# Patient Record
Sex: MALE | Race: WHITE | NOT HISPANIC OR LATINO | Employment: OTHER | URBAN - METROPOLITAN AREA
[De-identification: names, ages, dates, MRNs, and addresses within clinical notes are randomized per-mention and may not be internally consistent; named-entity substitution may affect disease eponyms.]

---

## 2017-01-13 ENCOUNTER — TRANSFERRED RECORDS (OUTPATIENT)
Dept: HEALTH INFORMATION MANAGEMENT | Facility: CLINIC | Age: 57
End: 2017-01-13

## 2017-02-17 ENCOUNTER — TRANSFERRED RECORDS (OUTPATIENT)
Dept: HEALTH INFORMATION MANAGEMENT | Facility: CLINIC | Age: 57
End: 2017-02-17

## 2017-03-24 ENCOUNTER — DOCUMENTATION ONLY (OUTPATIENT)
Dept: SURGERY | Facility: CLINIC | Age: 57
End: 2017-03-24

## 2017-03-24 NOTE — PROGRESS NOTES
Late entry - Pt discussed with Dr Ruff last week - Per Dr Ruff pt to be seen in clinic by him to discuss abdominal pain following multiple abdominal surgeries to include RYGB.  Per Dr Ruff, this pt is not appropriate to see Irene Gold PA-C given pt's chief complaint of abdominal complaints following RYGB and several other abdominal surgeries.    Records in clinic - Op notes to be faxed to both clinic and bariatric faxes.      Pennie Durbin RN

## 2017-03-27 ENCOUNTER — OFFICE VISIT (OUTPATIENT)
Dept: SURGERY | Facility: CLINIC | Age: 57
End: 2017-03-27

## 2017-03-27 VITALS
HEART RATE: 56 BPM | OXYGEN SATURATION: 100 % | BODY MASS INDEX: 30.69 KG/M2 | SYSTOLIC BLOOD PRESSURE: 137 MMHG | HEIGHT: 72 IN | WEIGHT: 226.6 LBS | DIASTOLIC BLOOD PRESSURE: 93 MMHG

## 2017-03-27 DIAGNOSIS — K31.83 ACHLORHYDRIA, GASTRIC: Primary | ICD-10-CM

## 2017-03-27 RX ORDER — SUMATRIPTAN 20 MG/1
1 SPRAY NASAL PRN
COMMUNITY
End: 2022-03-03

## 2017-03-27 RX ORDER — SUMATRIPTAN SUCCINATE 6 MG/.5ML
INJECTION, SOLUTION SUBCUTANEOUS ONCE
COMMUNITY

## 2017-03-27 RX ORDER — SUCRALFATE 1 G/1
1 TABLET ORAL 4 TIMES DAILY
COMMUNITY

## 2017-03-27 ASSESSMENT — ENCOUNTER SYMPTOMS
PARALYSIS: 0
SEIZURES: 0
RECTAL PAIN: 0
WEAKNESS: 0
DOUBLE VISION: 0
JAUNDICE: 0
SPEECH CHANGE: 0
NIGHT SWEATS: 0
NECK PAIN: 0
WHEEZING: 0
EYE IRRITATION: 0
SWOLLEN GLANDS: 0
POSTURAL DYSPNEA: 0
SINUS PAIN: 1
SMELL DISTURBANCE: 0
NAIL CHANGES: 0
COUGH: 0
TROUBLE SWALLOWING: 1
RESPIRATORY PAIN: 0
HYPOTENSION: 0
INSOMNIA: 1
FLANK PAIN: 0
BLOOD IN STOOL: 0
FEVER: 0
DYSPNEA ON EXERTION: 0
BLOATING: 1
SLEEP DISTURBANCES DUE TO BREATHING: 0
JOINT SWELLING: 0
NUMBNESS: 0
DYSURIA: 0
HEARTBURN: 1
SKIN CHANGES: 0
SNORES LOUDLY: 0
DISTURBANCES IN COORDINATION: 0
STIFFNESS: 0
HYPERTENSION: 0
NAUSEA: 1
ALTERED TEMPERATURE REGULATION: 1
VOMITING: 1
ORTHOPNEA: 0
SHORTNESS OF BREATH: 0
ARTHRALGIAS: 0
MEMORY LOSS: 0
WEIGHT GAIN: 0
LOSS OF CONSCIOUSNESS: 0
HEMOPTYSIS: 0
COUGH DISTURBING SLEEP: 0
MUSCLE CRAMPS: 0
DIARRHEA: 0
POOR WOUND HEALING: 0
CHILLS: 0
INCREASED ENERGY: 0
SYNCOPE: 0
POLYDIPSIA: 0
RECTAL BLEEDING: 0
DIZZINESS: 0
EYE PAIN: 0
CONSTIPATION: 0
BOWEL INCONTINENCE: 0
TASTE DISTURBANCE: 0
HEMATURIA: 0
EYE REDNESS: 0
TREMORS: 0
LIGHT-HEADEDNESS: 0
DIFFICULTY URINATING: 0
DECREASED APPETITE: 0
SINUS CONGESTION: 0
DEPRESSION: 1
NERVOUS/ANXIOUS: 0
HALLUCINATIONS: 0
LEG PAIN: 0
EXERCISE INTOLERANCE: 0
EYE WATERING: 0
SPUTUM PRODUCTION: 0
HOARSE VOICE: 0
BRUISES/BLEEDS EASILY: 0
CLAUDICATION: 0
DECREASED CONCENTRATION: 0
TACHYCARDIA: 0
NECK MASS: 0
WEIGHT LOSS: 0
TINGLING: 0
PALPITATIONS: 0
FATIGUE: 1
LEG SWELLING: 0
MYALGIAS: 0
PANIC: 0
MUSCLE WEAKNESS: 0
SORE THROAT: 1
ABDOMINAL PAIN: 1
BACK PAIN: 0
POLYPHAGIA: 0
HEADACHES: 1

## 2017-03-27 ASSESSMENT — PAIN SCALES - GENERAL: PAINLEVEL: MODERATE PAIN (5)

## 2017-03-27 NOTE — MR AVS SNAPSHOT
After Visit Summary   3/27/2017    Joseph Perdomo    MRN: [id number]           Patient Information     Date Of Birth          1960        Visit Information        Provider Department      3/27/2017 8:00 AM Brandon Ruff MD [facility] Surgery        Care Instructions    It was a pleasure meeting with you today.     Thank you for allowing us the privilege of caring for you. We hope we provided you with the excellent service you deserve.     Please let us know if there is anything else we can do for you so that we can be sure you are leaving completely satisfied with your care experience.      You saw Dr Fulton today.     Instructions per today's visit: Stop smoking, need to obtain endoscopy report and CT.   We will contact you after reviewing information with plan.      To schedule appointments with our team, please call [phone] option #1    Please call during clinic hours Monday through Friday 8:00a - 4:00p if you have questions or you can contact us via [employer] at anytime.      Nurses: [phone] Option # 3 for nurse advice line.  Fax: [phone]  Surgery Scheduler: [phone]    Please call the hospital at [phone] to speak with our on call MDs if you have urgent needs after hours, during weekends, or holidays.            Follow-ups after your visit        Who to contact     Please call your clinic at [phone] to:    Ask questions about your health    Make or cancel appointments    Discuss your medicines    Learn about your test results    Speak to your doctor   If you have compliments or concerns about an experience at your clinic, or if you wish to file a complaint, please contact [facility] Patient Relations at [phone] or email us at [email or web address]         Additional Information About Your Visit        [employer] Information     [employer] is an electronic gateway that provides easy, online access to your medical  records. With [employer], you can request a clinic appointment, read your test results, renew a prescription or communicate with your care team.     To sign up for [employer] visit the website at [email or web address]   You will be asked to enter the access code listed below, as well as some personal information. Please follow the directions to create your username and password.     Your access code is: [email or web address]  Expires: 2017  6:30 AM     Your access code will  in 90 days. If you need help or a new code, please contact your [facility] or call [phone] for assistance.        Care EveryWhere ID     This is your Care EveryWhere ID. This could be used by other organizations to access your [place] medical records  [id number]        Your Vitals Were     Pulse Height Pulse Oximetry BMI (Body Mass Index)          56 6' 100% 30.73 kg/m2         Blood Pressure from Last 3 Encounters:   17 (!) 137/93    Weight from Last 3 Encounters:   17 226 lb 9.6 oz              Today, you had the following     No orders found for display       Primary Care Provider Office Phone # Fax #    Tyler J Dunphy [phone]       [facility] [address] [place] [address]        Thank you!     Thank you for choosing [facility] SURGERY  for your care. Our goal is always to provide you with excellent care. Hearing back from our patients is one way we can continue to improve our services. Please take a few minutes to complete the written survey that you may receive in the mail after your visit with us. Thank you!             Your Updated Medication List - Protect others around you: Learn how to safely use, store and throw away your medicines at www.disposemymeds.org.          This list is accurate as of: 3/27/17  8:46 AM.  Always use your most recent med list.                   Brand Name Dispense Instructions for use    AMBIEN PO      Take 10 mg by  mouth At Bedtime       DITROPAN PO      Take 5 mg by mouth 3 times daily       LYRICA PO      Take 100 mg by mouth 3 times daily       PRILOSEC PO      Take 40 mg by mouth 2 times daily (before meals)       sucralfate 1 GM tablet    CARAFATE     Take 1 g by mouth 4 times daily       SUMAtriptan 20 MG/ACT nasal spray    IMITREX     Spray 1 spray in nostril as needed for migraine       SUMAtriptan Succinate Refill 6 MG/0.5ML Soct    IMITREX     Inject Subcutaneous once       TOPAMAX PO      Take 25 mg by mouth 2 times daily       ZOCOR PO      Take 20 mg by mouth At Bedtime       ZOFRAN ODT PO      Take 4 mg by mouth every 8 hours as needed for nausea

## 2017-03-27 NOTE — PROGRESS NOTES
New General Surgery Consultation Note        Joseph Perdomo  [id number]  1960 March 27, 2017     Requesting Provider: Tyler J Dunphy     I had the pleasure of seeing your patient, Joseph Perdomo.  He is a 56 year old male who is being seen in consultation for the following concern(s).     CHIEF COMPLAINT:  Chief Complaint Reviewed With Patient 3/27/2017   I am here today to be seen for: abdominal pain       HISTORY OF PRESENT ILLNESS:  56 year old male with complicated past surgical history who presents with left sided abdominal pain and frequent vomiting.  Foregut surgical history includes multiple fundoplication surgeries about 10 years ago for GERD.  These were effective for a few years.  He also had ventral hernia repair with mesh.  In 1318-1921 he began having pain over his left side and frequent vomiting similar to his current symptoms.  In early 2013 he had a perforation of his stomach and underwent emergency surgery.  Mesh was found to be infected at that time as well and was removed.  Over the next few months he had problems with gastroparesis and ended up undergoing a distal gastric resection with Lucinda en Y reconstruction.  He has done fairly well with this until the last 8 months or so when his symptoms of pain and vomiting have started again.  His current symptoms do not seem to be related to food intake.  Pain and vomiting seemed to be linked with pain increasing during vomiting.   He states that he has had a recent endoscopy which was negative.     SEVERITY:   Severity of Present Illness Reviewed With Patient 3/27/2017   How would you describe your symptoms? Moderate       TIMING:  Timing of Present Illness Reviewed With Patient 3/27/2017   The onset of my symptoms was: Gradual   My symptoms are: Intermittent (come and go)   My symptoms have been: Worsening     Review of Systems     Constitutional:  Positive for fatigue and recent stressors. Negative for fever, chills, weight loss, weight  gain, decreased appetite, night sweats, height loss, post-operative complications, incisional pain, hallucinations, increased energy, hyperactivity and confused.   HENT:  Positive for trouble swallowing, sore throat, tooth pain, gum tenderness and sinus pain. Negative for ear pain, hearing loss, tinnitus, nosebleeds, hoarse voice, mouth sores, ear discharge, taste disturbance, smell disturbance, hearing aid, bleeding gums, dry mouth, sinus congestion and neck mass.    Eyes:  Negative for double vision, pain, redness, eye pain, decreased vision, eye watering, eye bulging, eye dryness, flashing lights, spots, floaters, strabismus, tunnel vision, jaundice and eye irritation.   Respiratory:   Negative for cough, hemoptysis, sputum production, shortness of breath, wheezing, sleep disturbances due to breathing, snores loudly, respiratory pain, dyspnea on exertion, cough disturbing sleep and postural dyspnea.    Cardiovascular:  Negative for chest pain, dyspnea on exertion, palpitations, orthopnea, claudication, leg swelling, fingers/toes turn blue, hypertension, hypotension, syncope, history of heart murmur, chest pain on exertion, chest pain at rest, pacemaker, few scattered varicosities, leg pain, sleep disturbances due to breathing, tachycardia, light-headedness, exercise intolerance and edema.   Gastrointestinal:  Positive for heartburn, nausea, vomiting, abdominal pain, bloating and change in stool. Negative for diarrhea, constipation, blood in stool, melena, rectal pain, hemorrhoids, bowel incontinence, jaundice, rectal bleeding and coffee ground emesis.   Genitourinary:  Negative for bladder incontinence, dysuria, urgency, hematuria, flank pain, difficulty urinating, nocturia, voiding less frequently, scrotal pain, ulcerations, penile discharge, male genitourinary complaint and reduced libido.   Musculoskeletal:  Negative for myalgias, back pain, joint swelling, arthralgias, stiffness, muscle cramps, neck pain,  bone pain, muscle weakness and fracture.   Skin:  Negative for nail changes, itching, poor wound healing, rash, hair changes, skin changes, acne, warts, poor wound healing, scarring, flaky skin, Raynaud's phenomenon, sensitivity to sunlight and skin thickening.   Neurological:  Positive for headaches. Negative for dizziness, tingling, tremors, speech change, seizures, loss of consciousness, weakness, light-headedness, numbness, disturbances in coordination, memory loss, difficulty walking and paralysis.   Endo/Heme:  Negative for anemia, swollen glands and bruises/bleeds easily.   Psychiatric/Behavioral:  Positive for depression. Negative for hallucinations, memory loss, decreased concentration, mood swings and panic attacks.    Endocrine:  Positive for altered temperature regulation.Negative for polyphagia, polydipsia, unwanted hair growth and change in facial hair.      PAST MEDICAL HISTORY:  HTN  Kidney stones  Fatty liver     PAST SURGICAL HISTORY:  Fundoplication x2  Multiple abdominal hernia repairs with mesh  Ex lap for stomach perforation  RYGB for gastroparesis  Sigmoidectomy for diverticulitis  Multiple back and neck procedures     MEDICATIONS:  Current Outpatient Prescriptions   Medication     Omeprazole (PRILOSEC PO)     Ondansetron (ZOFRAN ODT PO)     Pregabalin (LYRICA PO)     Oxybutynin Chloride (DITROPAN PO)     Simvastatin (ZOCOR PO)     sucralfate (CARAFATE) 1 GM tablet     SUMAtriptan (IMITREX) 20 MG/ACT nasal spray     SUMAtriptan Succinate Refill (IMITREX) 6 MG/0.5ML SOCT     Topiramate (TOPAMAX PO)     Zolpidem Tartrate (AMBIEN PO)     No current facility-administered medications for this visit.         ALLERGIES:  Allergies not on file     SOCIAL HISTORY:  1 ppd smoker  1 drink/month alcohol  Denies other drugs    FAMILY HISTORY:  No family history on file.     PHYSICAL EXAM:  Vital Signs: BP (!) 137/93  Pulse 56  Ht 1.829 m (6')  Wt 102.8 kg (226 lb 9.6 oz)  SpO2 100%  BMI 30.73  kg/m2  HEENT: NCAT; MMM;   Lungs: Breathing unlabored  Abdomen: soft, non distended.  Multiple well healed scars.  Mild tenderness in LUQ    ASSESSMENT:   Joseph Perdomo is a 56 year old male with extensive past surgical history who presents with 8 months of LUQ pain and vomiting.  In all likelihood, this pain is secondary to ulcer disease for which he would be very high risk secondary to his smoking and altered anatomy.  This could be the case even though no obvious ulceration was seen on recent endoscopy.  We discussed the importance of smoking cessation and the role it plays in ischemia of intraabdominal organs.      PLAN:   - Smoking cessation  - Avoid NSAIDs  - Continue PPI  - Obtain records from OSH for recent endoscopy and CT scan  - After evaluation of imaging, we will determine if further studies are needed  No orders of the defined types were placed in this encounter.      Sincerely,     Brandon Ruff MD  Surgery  [phone] (hospital )  [phone] (clinic nurses)

## 2017-03-27 NOTE — LETTER
3/27/2017       RE: Joseph Perdomo  [address] [place] [address]     Dear Colleague,    Thank you for referring your patient, Joseph Perdomo, to the [facility] GENERAL SURGERY at [facility]. Please see a copy of my visit note below.        New General Surgery Consultation Note        Joseph Perdomo  [id number]  1960 March 27, 2017     Requesting Provider: Tyler J Dunphy     Dear Dr Dunphy, Tyler J,     I had the pleasure of seeing your patient, Joseph Perdomo.  He is a 56 year old male who is being seen in consultation for the following concern(s).     CHIEF COMPLAINT:  Chief Complaint Reviewed With Patient 3/27/2017   I am here today to be seen for: abdominal pain       HISTORY OF PRESENT ILLNESS:  56 year old male with complicated past surgical history who presents with left sided abdominal pain and frequent vomiting.  Foregut surgical history includes multiple fundoplication surgeries about 10 years ago for GERD.  These were effective for a few years.  He also had ventral hernia repair with mesh.  In 2481-4060 he began having pain over his left side and frequent vomiting similar to his current symptoms.  In early 2013 he had a perforation of his stomach and underwent emergency surgery.  Mesh was found to be infected at that time as well and was removed.  Over the next few months he had problems with gastroparesis and ended up undergoing a distal gastric resection with Lucinda en Y reconstruction.  He has done fairly well with this until the last 8 months or so when his symptoms of pain and vomiting have started again.  His current symptoms do not seem to be related to food intake.  Pain and vomiting seemed to be linked with pain increasing during vomiting.   He states that he has had a recent endoscopy which was negative.     SEVERITY:   Severity of Present Illness Reviewed With Patient 3/27/2017   How would you describe your symptoms? Moderate        TIMING:  Timing of Present Illness Reviewed With Patient 3/27/2017   The onset of my symptoms was: Gradual   My symptoms are: Intermittent (come and go)   My symptoms have been: Worsening     Review of Systems     Constitutional:  Positive for fatigue and recent stressors. Negative for fever, chills, weight loss, weight gain, decreased appetite, night sweats, height loss, post-operative complications, incisional pain, hallucinations, increased energy, hyperactivity and confused.   HENT:  Positive for trouble swallowing, sore throat, tooth pain, gum tenderness and sinus pain. Negative for ear pain, hearing loss, tinnitus, nosebleeds, hoarse voice, mouth sores, ear discharge, taste disturbance, smell disturbance, hearing aid, bleeding gums, dry mouth, sinus congestion and neck mass.    Eyes:  Negative for double vision, pain, redness, eye pain, decreased vision, eye watering, eye bulging, eye dryness, flashing lights, spots, floaters, strabismus, tunnel vision, jaundice and eye irritation.   Respiratory:   Negative for cough, hemoptysis, sputum production, shortness of breath, wheezing, sleep disturbances due to breathing, snores loudly, respiratory pain, dyspnea on exertion, cough disturbing sleep and postural dyspnea.    Cardiovascular:  Negative for chest pain, dyspnea on exertion, palpitations, orthopnea, claudication, leg swelling, fingers/toes turn blue, hypertension, hypotension, syncope, history of heart murmur, chest pain on exertion, chest pain at rest, pacemaker, few scattered varicosities, leg pain, sleep disturbances due to breathing, tachycardia, light-headedness, exercise intolerance and edema.   Gastrointestinal:  Positive for heartburn, nausea, vomiting, abdominal pain, bloating and change in stool. Negative for diarrhea, constipation, blood in stool, melena, rectal pain, hemorrhoids, bowel incontinence, jaundice, rectal bleeding and coffee ground emesis.   Genitourinary:  Negative for bladder  incontinence, dysuria, urgency, hematuria, flank pain, difficulty urinating, nocturia, voiding less frequently, scrotal pain, ulcerations, penile discharge, male genitourinary complaint and reduced libido.   Musculoskeletal:  Negative for myalgias, back pain, joint swelling, arthralgias, stiffness, muscle cramps, neck pain, bone pain, muscle weakness and fracture.   Skin:  Negative for nail changes, itching, poor wound healing, rash, hair changes, skin changes, acne, warts, poor wound healing, scarring, flaky skin, Raynaud's phenomenon, sensitivity to sunlight and skin thickening.   Neurological:  Positive for headaches. Negative for dizziness, tingling, tremors, speech change, seizures, loss of consciousness, weakness, light-headedness, numbness, disturbances in coordination, memory loss, difficulty walking and paralysis.   Endo/Heme:  Negative for anemia, swollen glands and bruises/bleeds easily.   Psychiatric/Behavioral:  Positive for depression. Negative for hallucinations, memory loss, decreased concentration, mood swings and panic attacks.    Endocrine:  Positive for altered temperature regulation.Negative for polyphagia, polydipsia, unwanted hair growth and change in facial hair.      PAST MEDICAL HISTORY:  HTN  Kidney stones  Fatty liver     PAST SURGICAL HISTORY:  Fundoplication x2  Multiple abdominal hernia repairs with mesh  Ex lap for stomach perforation  RYGB for gastroparesis  Sigmoidectomy for diverticulitis  Multiple back and neck procedures     MEDICATIONS:  Current Outpatient Prescriptions   Medication     Omeprazole (PRILOSEC PO)     Ondansetron (ZOFRAN ODT PO)     Pregabalin (LYRICA PO)     Oxybutynin Chloride (DITROPAN PO)     Simvastatin (ZOCOR PO)     sucralfate (CARAFATE) 1 GM tablet     SUMAtriptan (IMITREX) 20 MG/ACT nasal spray     SUMAtriptan Succinate Refill (IMITREX) 6 MG/0.5ML SOCT     Topiramate (TOPAMAX PO)     Zolpidem Tartrate (AMBIEN PO)     No current facility-administered  medications for this visit.         ALLERGIES:  Allergies not on file     SOCIAL HISTORY:  1 ppd smoker  1 drink/month alcohol  Denies other drugs    FAMILY HISTORY:  No family history on file.     PHYSICAL EXAM:  Vital Signs: BP (!) 137/93  Pulse 56  Ht 1.829 m (6')  Wt 102.8 kg (226 lb 9.6 oz)  SpO2 100%  BMI 30.73 kg/m2  HEENT: NCAT; MMM;   Lungs: Breathing unlabored  Abdomen: soft, non distended.  Multiple well healed scars.  Mild tenderness in LUQ    ASSESSMENT:   Joseph Perdomo is a 56 year old male with extensive past surgical history who presents with 8 months of LUQ pain and vomiting.  In all likelihood, this pain is secondary to ulcer disease for which he would be very high risk secondary to his smoking and altered anatomy.  This could be the case even though no obvious ulceration was seen on recent endoscopy.  We discussed the importance of smoking cessation and the role it plays in ischemia of intraabdominal organs.      PLAN:   - Smoking cessation  - Avoid NSAIDs  - Continue PPI  - Obtain records from OSH for recent endoscopy and CT scan  - After evaluation of imaging, we will determine if further studies are needed  No orders of the defined types were placed in this encounter.      Sincerely,     Barak Arenas MD    Again, thank you for allowing me to participate in the care of your patient.      Sincerely,    Brandon Ruff MD

## 2017-03-27 NOTE — PATIENT INSTRUCTIONS
It was a pleasure meeting with you today.     Thank you for allowing us the privilege of caring for you. We hope we provided you with the excellent service you deserve.     Please let us know if there is anything else we can do for you so that we can be sure you are leaving completely satisfied with your care experience.      You saw Dr Fulton today.     Instructions per today's visit: Stop smoking, need to obtain endoscopy report and CT.   We will contact you after reviewing information with plan.      To schedule appointments with our team, please call [phone] option #1    Please call during clinic hours Monday through Friday 8:00a - 4:00p if you have questions or you can contact us via [employer] at anytime.      Nurses: [phone] Option # 3 for nurse advice line.  Fax: [phone]  Surgery Scheduler: [phone]    Please call the hospital at [phone] to speak with our on call MDs if you have urgent needs after hours, during weekends, or holidays.

## 2017-04-21 ENCOUNTER — CARE COORDINATION (OUTPATIENT)
Dept: SURGERY | Facility: CLINIC | Age: 57
End: 2017-04-21

## 2017-04-21 NOTE — PROGRESS NOTES
Called patient per Dr. Ruff to ler him know there was no structurally problems in the upper part per imagine but no way to see lower part of stomach or evaluate. So Dr. Ruff would like him smoke free for 6 mths and then f/u  In clinic to discuss next step wether it be surgery or some claudette of procedure to evaluate lower stomach.

## 2021-09-19 ENCOUNTER — TRANSFERRED RECORDS (OUTPATIENT)
Dept: HEALTH INFORMATION MANAGEMENT | Facility: CLINIC | Age: 61
End: 2021-09-19
Payer: COMMERCIAL

## 2021-09-23 ENCOUNTER — TRANSFERRED RECORDS (OUTPATIENT)
Dept: HEALTH INFORMATION MANAGEMENT | Facility: CLINIC | Age: 61
End: 2021-09-23
Payer: COMMERCIAL

## 2021-11-20 ENCOUNTER — TRANSFERRED RECORDS (OUTPATIENT)
Dept: HEALTH INFORMATION MANAGEMENT | Facility: CLINIC | Age: 61
End: 2021-11-20
Payer: COMMERCIAL

## 2021-11-23 ENCOUNTER — TRANSCRIBE ORDERS (OUTPATIENT)
Dept: OTHER | Age: 61
End: 2021-11-23

## 2021-11-23 ENCOUNTER — MEDICAL CORRESPONDENCE (OUTPATIENT)
Dept: HEALTH INFORMATION MANAGEMENT | Facility: CLINIC | Age: 61
End: 2021-11-23
Payer: COMMERCIAL

## 2021-11-23 DIAGNOSIS — K31.84 GASTROPARESIS: Primary | ICD-10-CM

## 2021-11-30 NOTE — TELEPHONE ENCOUNTER
REFERRAL INFORMATION:    Referring Provider:  Dr. Ramu Early     Referring Clinic:  [facility]     Reason for Visit/Diagnosis: Gastroparesis      FUTURE VISIT INFORMATION:    Appointment Date: 3/3/2022    Appointment Time: 4:20 PM      NOTES STATUS DETAILS   OFFICE NOTE from Referring Provider Care Everywhere 9/23/2021, 3/25/2021 Office visit with Dr. Early     OFFICE NOTE from Other Specialist Care Everywhere 3/1/2021 Office visit with Dr. Tyler Dunphy ([facility])    1/7/2021 Office visit with Dr. Raza Rogers ([facility])      HOSPITAL DISCHARGE SUMMARY/  ED VISITS Care Everywhere 11/20/2021, 8/1/2021, 7/7/2021, 5/22/2021 ([facility])     ** More visits in CE     OPERATIVE REPORT N/A    MEDICATION LIST Internal         ENDOSCOPY  Care Everywhere EGD: 3/13/2020, 11/29/17 ([facility])    COLONOSCOPY Care Everywhere 3/13/2020 ([facility])    ERCP N/A    EUS N/A    STOOL TESTING N/A    PERTINENT LABS Care Everywhere    PATHOLOGY REPORTS (RELATED) N/A    IMAGING (CT, MRI, EGD, MRCP, Small Bowel Follow Through/SBT, MR/CT Enterography) Care Everywhere [facility]:  - CT Abdomen Pelvis: 11/20/2021, 7/8/2021, 6/3/2021, 4/27/2021  - US Abdomen: 9/10/2020  - NM Gastric Emptying: 10/8/18     2/4/2022 8:40am Fax request sent to [facility] ([phone]) for images. Mike

## 2022-02-21 ENCOUNTER — TELEPHONE (OUTPATIENT)
Dept: GASTROENTEROLOGY | Facility: CLINIC | Age: 62
End: 2022-02-21
Payer: COMMERCIAL

## 2022-02-21 NOTE — TELEPHONE ENCOUNTER
Called to remind patient of their upcoming appointment with our GI clinic, on Thursday 3/3/2022 at 2:40PM with Dr. Al. This appointment is scheduled as a telephone visit. You will receive a call approximately 30 minutes prior to check you in, you must be in [place] for this visit., if your appointment is virtual (video or telephone) you need to be in [place] for the visit. To reschedule or cancel patient to call [phone].    Génesis Traylor MA

## 2022-03-03 ENCOUNTER — VIRTUAL VISIT (OUTPATIENT)
Dept: GASTROENTEROLOGY | Facility: CLINIC | Age: 62
End: 2022-03-03
Attending: INTERNAL MEDICINE
Payer: COMMERCIAL

## 2022-03-03 ENCOUNTER — PRE VISIT (OUTPATIENT)
Dept: GASTROENTEROLOGY | Facility: CLINIC | Age: 62
End: 2022-03-03

## 2022-03-03 DIAGNOSIS — R12 HEARTBURN: Primary | ICD-10-CM

## 2022-03-03 DIAGNOSIS — R11.2 NON-INTRACTABLE VOMITING WITH NAUSEA, UNSPECIFIED VOMITING TYPE: ICD-10-CM

## 2022-03-03 DIAGNOSIS — G89.29 CHRONIC ABDOMINAL PAIN: ICD-10-CM

## 2022-03-03 DIAGNOSIS — R10.9 CHRONIC ABDOMINAL PAIN: ICD-10-CM

## 2022-03-03 DIAGNOSIS — Z98.84 HISTORY OF ROUX-EN-Y GASTRIC BYPASS: ICD-10-CM

## 2022-03-03 DIAGNOSIS — Z98.890 HISTORY OF NISSEN FUNDOPLICATION: ICD-10-CM

## 2022-03-03 PROCEDURE — 99443 PR PHYSICIAN TELEPHONE EVALUATION 21-30 MIN: CPT | Mod: 95 | Performed by: INTERNAL MEDICINE

## 2022-03-03 NOTE — PATIENT INSTRUCTIONS
It was a pleasure taking care of you today.  I've included a brief summary of our discussion and care plan from today's visit below.  Please review this information with your primary care provider.  _______________________________________________________________________    My recommendations are summarized as follows:    1. Please begin taking aciphex (rabeprazole) 20mg twice daily 30-60 minutes before breakfast and dinner.   2. Please send me a message when you have obtained the medication and then one month later provide an update if your symptoms of reflux are the same/better/worse.  3. If your symptoms are not better we may want to pursue an upper endoscopy with 96hr OFF therapy Bravo to test how much reflux you actually have.   4. For your abdominal pain you may wish to consider mirtazapine (remeron) in the future    To schedule endoscopic procedures you may call: [phone]  To schedule radiology tests you may call: [phone]  To schedule an ENT appointment you may call: [phone]    Please call my nurse Mary ([phone])Lamine ([phone]) with any questions or concerns.  If you were seen through the [facility] please feel free to reach out to Kathy at [phone]   --    Return to GI Clinic in 4 months to review your progress.    _______________________________________________________________________    Who do I call with any questions after my visit?  Please be in touch if there are any further questions that arise following today's visit.  There are multiple ways to contact your gastroenterology care team.        During business hours, you may reach a Gastroenterology nurse at [phone] and choose option 3.         To schedule or reschedule an appointment, please call [phone].       You can always send a secure message through [employer].  [employer] messages are answered by your nurse or doctor typically within 24 hours.  Please allow extra time on weekends and holidays.        For  urgent/emergent questions after business hours, you may reach the on-call GI Fellow by contacting the [facility]  at [phone].     How will I get the results of any tests ordered?    You will receive all of your results.  If you have signed up for [employer], any tests ordered at your visit will be available to you after your physician reviews them.  Typically this takes 1-2 weeks.  If there are urgent results that require a change in your care plan, your physician or nurse will call you to discuss the next steps.      What is ICONOGRAFICO?  ICONOGRAFICO is a secure way for you to access all of your healthcare records from the [facility].  It is a web based computer program, so you can sign on to it from any location.  It also allows you to send secure messages to your care team.  I recommend signing up for ICONOGRAFICO access if you have not already done so and are comfortable with using a computer.      How to I schedule a follow-up visit?  If you did not schedule a follow-up visit today, please call [phone] to schedule a follow-up office visit.      If you feel you received exceptional care and are interested in supporting the clinical and research goals of Dr. Al or the Division of Gastroenterology, Hepatology, and Nutrition please contact [email or web address] from the [facility] to discuss opportunities to donate.    Sincerely,    Mateusz Al DO   of Medicine  Director, Esophageal Disorders Program  Division of Gastroenterology, Hepatology, and Nutrition  [facility]

## 2022-03-03 NOTE — PROGRESS NOTES
"Telephone visit:  Start 4:00  End 4:32    Gastroenterology Visit for: Joseph Perdomo 1960   MRN: [id number]     Reason for Visit:  chief complaint    Referred by: Sharath  / [facility]  [address]  / [facility] [id number]  Patient Care Team:  Dunphy, Tyler J as PCP - General (Internal Medicine)  Brandon Ruff MD as MD (Surgery)    History of Present Illness:   Joseph Perdomo is a 61 year old male with a significant past medical history noted below following the HPI who is presenting as a new patient in consultation at the request of Dr. Early with a chief complaint of heartburn/reflux and chronic abdominal pain.  ---------------------------------------------------------------  Joseph Perdomo states he had mesh put in his abdomen about 15 years ago. The mesh got infected and he was septic and was in ICU for >1wk. He was having significant vomiting and reports half his stomach . They performed a berlin en y. He had diverticulitis with colectomy as well. He states that he chronically has abdominal pain and has had this pain since his infected mesh which has resulted in numerous subsequent surgeries. However, his biggest complaint that he would like addressed is his heartburn.      If he eats he has persistent belching for several hours or he throws it up. He has had \"a lot of tests\". He states that his stomach hurts all the time. The pain is described as located under his xiphoid/epigastric area. Constant pain. Worse when he eats. He states it is not associated with specific foods. If he eats ice cream his pain is really bad. About 15-20 minutes after eating he has a worsening of pain. After eating 2-3 hours of elevated pain before decreasing back to baseline pain.     He has nausea and vomiting as well. This is associated with eating.     He states he has heartburn constantly and feels that he has reflux up his throat when laying supine. He has to take tums at night to relieve that.     He has tried " omeprazole, Seroquel, Lexapro, amitriptyline, Flexeril, Compazine, Zofran, gabapentin, and hydrocodone/acetaminophen.    Not currently on opioids.     Moving bowels twice a day but ranges from normal to diarrhea.     Feels like no medications have ever helped.      History per 9/23/21  *Long history of acid reflux status post Nissen fundoplication  *Sigmoid resection for diverticular disease  *August 19, 2013, EGD. This documented extensive amount of food debris in the esophagus and gastric lumen despite the patient having been n.p.o. for 15-1/2 hours. Dilation of the pyloric channel with an 18 mm to 20 mm TTS balloon dilator system was performed. This did not result in any improvement of symptoms and signs.   *September 2013 - perforated gastric ulcer. He underwent open laparotomy with subtotal gastrectomy and Lucinda-en-Y reconstructive surgery  *September 17, 2014 -  laparoscopic incisional hernia repair with mesh material utilized for the intervention  *January 2, 2015. EGD noted postoperative changes of subtotal gastrectomy with Lucinda-en-Y anastomosis. The jejunojejunal anastomosis was never visualized with the standard endoscope  *July of 2015 signs and symptoms suggestive of proximal small bowel obstructive process noted in clinic. Dr. Rogers saw him shortly thereafter and also felt that intermittent obstruction of the jejunojejunal anastomosis was highly likely. Operative revision was recommended.   *August 5, 2015. exploratory laparotomy with enterolysis and revision of the jejunojejunal ostomy. Dr. Rogers notes on the procedure report that there were extensive adhesions  *January 12, 2016 - Enteroscopy with postoperative anatomy of gastrojejunal anastomosis with no obvious stenosis of the gastrojejunal anastomosis, but with rather significant convolution and tortuosity encountered in the first 20 cm of the jejunal limb. Successful advancement of the pediatric colonoscope to the jejunojejunal anastomosis  with what appears to be widely patent anatomy. Two limbs of small bowel transited through for an additional 40 cm as described. Solid and semisolid debris present throughout the procedure, encountered at the jejunojejunal anastomosis and in the mid functional jejunal limb between the anastomosis and the gastrojejunal anastomosis. Gastric contents present, semisolid and liquid in nature, encountered.  *January 13, 2017 - Colonoscopy normal with patent end to side colocolonic anastomosis  *September 22, 2017 HIDA scan normal  *November 29, 2017-upper endoscopy with normal esophagus, subtotal gastrectomy changes with small amount of food residue. Widely patent gastrojejunal anastomosis. Short 10 cm of jejunal limb normal mucosa. Long segment of jejunum to operative anastomosis with 2 lm identified. One lumen could be advanced into for 15 cm second lumen could not be advanced into and inspected. Small amount of old food material noted in this region.  *December 11, 2017-trigger point injection for abdominal wall pain syndrome. No clinical improvement noted  *July 17, 2018-patient evaluated by infectious disease specialists for night sweats, lack of energy, lack of sleep and abdominal pain. Urine histoplasma antigen, Blastomyces antigen, blood cultures, hepatitis C antibody, HIV antibody, treponema antibody, Brucella antibody, Q fever antibody, tick borne disease panel were all unremarkable.  *10/8/2018-gastric emptying study-delayed gastric emptying  *October 12, 2018-patient was seen by general surgery at [facility] in [place] who discussed possibility of chronic mesh infection with infectious disease specialist. Gadolinium scan had been done in September and showed some activity at the upper part of his prior hernia repair. Plan is in process to remove surgical mesh.  Risk   *November 1, 2018-initial GI consultation with myself. Trial of rifaximin for possible SIBO with no response after 10 days.  * November 28,  2018 patient went to the operating room and noted mesh failure. Mesh was removed. A supra mesh hernia was noted this was repaired. Lysis of adhesions. Patient was discharged on December 1, 2018 with oxycodone for pain control  *December 3, 2018-presented to the emergency department nausea vomiting. Was given IV fluids, Dilaudid and started on twice daily Reglan.  *December 10, 2018-again presented to the emergency department with abdominal pain, nausea and vomiting. CBC, BMP, hepatic panel and lipase all unremarkable. CT abdomen and pelvis with no acute changes.  *2/26/2019-EGD-normal esophagus. Gastroenterostomy was found and healthy-appearing. Widely patent enteroenterostomy with healthy-appearing mucosa in the jejunum. There was some bilious fluid in the alimentary limb which I suspect is due to short limb rather than downstream obstruction given numerous cross-sectional imaging exams.  *4/6/2019-CT abdomen/pelvis without contrast-nonobstructing nephrolithiasis. Multiple sites of prior postoperative change. No evidence for bowel obstruction, abscess or free air.  *5/28/2019-CTA chest-no evidence for PE. No focal infiltrates.  *7/26/2019-CT abdomen/pelvis without contrast-obstructing 3 mm right ureteral stone just distal ureteral pelvic junction with mild hydronephrosis. Obstructing 2 mm left renal stone with associated mild hydronephrosis.  *8/20/2019-CT abdomen/pelvis with IV contrast-no acute findings. Postoperative changes of Lucinda-en-Y gastric bypass, hemicolectomy, appendectomy, sigmoid colon resection with colocolonic anastomosis.  *11/16/2019-CT abdomen/pelvis with IV contrast-acute uncomplicated diverticulitis of the descending colon.  *12/26/2019-CT abdomen/pelvis with IV contrast-acute diverticulitis of the sigmoid colon.  *1/3/2020-CT abdomen/pelvis with IV contrast-near complete resolution of changes of acute diverticulitis. No abscess or fluid collection.  *1/25/2020-CT abdomen/pelvis with IV  contrast-nothing acute in abdomen or pelvis.  *2/4/2020-CT abdomen pelvis without IV contrast-stable exam. No interval changes.  *3/13/0906-FUI-scofxrlw LES otherwise normal with evidence of gastric bypass. Gastric pouch with large size and intact staple line. Colonoscopy-patent end-to-side colocolonic anastomosis which was healthy-appearing. Mild diverticulosis in the left colon with active diverticulitis. Exam otherwise normal  *4/13/2020-CT abdomen/pelvis without IV contrast-interval development of 2 small ventral hernias to the left of the midline near the umbilicus. Findings are consistent with incisional hernias. These findings may contribute to a partial small bowel obstruction.  *5/22/2020-CT abdomen/pelvis without IV contrast-mild right hydronephrosis secondary to a 2 to 3 mm stone in the proximal ureter. Residual punctate calculus involving lower pole of right kidney.  *6/13/2020-CT abdomen/pelvis without IV contrast-nothing acute  *7/31/2020-CT abdomen/pelvis with IV contrast-new postoperative changes of ventral hernia repair. Moderate amount of blood products insinuating within the rectus musculature bilaterally as well as more discrete hematoma within the subcutaneous tissue along the midline. Small amount of intraperitoneal blood products in the pelvis. New mild dilation of several loops of small bowel in the anterior abdomen. Findings favored to be secondary to ileus rather than obstruction.  *8/7/2020-CT abdomen/pelvis without IV contrast-large anterior wall presumed hematoma increased in size now measuring 15 cm. Small amount of presumed blood in pelvis is decreased. Tiny gallstone in gallbladder with distention but no signs of acute cholecystitis.  *8/23/2020-CT abdomen/pelvis with IV contrast-increased loculation and new gas within the anterior abdominal wall collection/hematoma. Findings suspicious for infected collection. Mild dilatation of intra and extrahepatic bile ducts,  nonspecific.  *11/10/2020-CT abdomen/pelvis with IV contrast-interval decrease in size of the collection within the anterior bowel wall, today measuring 16 x 41 x 51 mm. Remains suspicious for an abscess given the peripheral enhancement. No acute intra-abdominal findings.  *1/12/2021-CT abdomen/pelvis with IV contrast-fluid collection in anterior abdominal wall is resolved. Diverticulosis in the distal descending colon. No evidence for diverticulitis.   *2/22/2021-CT abdomen/pelvis with IV contrast-no acute findings  *3/2/2021-transversus abdominis nerve block with pain management.  *4/27/2021-CT abdomen/pelvis without IV contrast-no acute findings. No direct findings for left-sided pain.  *6/3/2021-CT abdomen/pelvis without IV contrast-postoperative changes at the level of the cecum, stomach and proximal small bowel. Sigmoid colon anastomosis. Stable distended loop of small bowel adjacent to the distal anastomosis. Overall stable exam compared to prior.  *7/8/2021-CT abdomen/pelvis with IV contrast-no evidence of bowel obstruction.  *8/1/2021-abdominal x-ray-nonobstructive bowel gas pattern. Moderate amount of stool throughout the colon. Ventral hernia repair.  *9/20/2021-neurology evaluation-diagnosed with chronic fatigue syndrome. Started on amitriptyline 25 mg at bedtime.      ------------------------------------------------  Wt Readings from Last 5 Encounters:   03/27/17 102.8 kg (226 lb 9.6 oz)        Esophageal Questionnaire(s)    BEDQ Questionnaire  No flowsheet data found.  No flowsheet data found.    Eckardt Questionnaire  No flowsheet data found.    Promis 10 Questionnaire  No flowsheet data found.        STUDIES & PROCEDURES:    EGD:   Date:  Impression:  Pathology Report:    Colonoscopy:  Date:  Impression:  Pathology Report:     EndoFLIP directed at the UES or LES (8cm (EF-325) balloon length or 16cm (EF-322) balloon length):   Date:  8cm balloon  Balloon inflation Balloon pressure CSA (mm^2) DI  (mm^2/mmHg) Dmin (mm) Compliance   20 (ladmark ID)        30        40        50           16cm balloon  Balloon inflation Balloon pressure CSA (mm^2) DI (mm^2/mmHg) Dmin (mm) Compliance   30 (ladmark ID)        40        50        60        70           High Resolution Manometry:  Date:  Impression:    PH/Impedance:  Date:  Impression:     Bravo:  48 or 96hr  Date:  Impression:    CT:  Date:  Impression:    Esophagram:  Date:  Impression:     Prior medical records were reviewed including, but not limited to, notes from referring providers, lab work, radiographic tests, and other diagnostic tests. Pertinent results were summarized above.     History   No past medical history on file.    No past surgical history on file.    Social History     Socioeconomic History     Marital status:      Spouse name: Not on file     Number of children: Not on file     Years of education: Not on file     Highest education level: Not on file   Occupational History     Not on file   Tobacco Use     Smoking status: Current Every Day Smoker     Smokeless tobacco: Not on file   Substance and Sexual Activity     Alcohol use: Not on file     Drug use: Not on file     Sexual activity: Not on file   Other Topics Concern     Not on file   Social History Narrative     Not on file     Social Determinants of Health     Financial Resource Strain: Not on file   Food Insecurity: Not on file   Transportation Needs: Not on file   Physical Activity: Not on file   Stress: Not on file   Social Connections: Not on file   Intimate Partner Violence: Not on file   Housing Stability: Not on file       No family history on file.  Family history reviewed and edited as appropriate    Medications and Allergies:     Outpatient Encounter Medications as of 3/3/2022   Medication Sig Dispense Refill     Omeprazole (PRILOSEC PO) Take 40 mg by mouth 2 times daily (before meals)       Ondansetron (ZOFRAN ODT PO) Take 4 mg by mouth every 8 hours as needed for  nausea       Oxybutynin Chloride (DITROPAN PO) Take 5 mg by mouth 3 times daily       Pregabalin (LYRICA PO) Take 100 mg by mouth 3 times daily       Simvastatin (ZOCOR PO) Take 20 mg by mouth At Bedtime       sucralfate (CARAFATE) 1 GM tablet Take 1 g by mouth 4 times daily       SUMAtriptan Succinate Refill (IMITREX) 6 MG/0.5ML SOCT Inject Subcutaneous once       Topiramate (TOPAMAX PO) Take 25 mg by mouth 2 times daily       Zolpidem Tartrate (AMBIEN PO) Take 10 mg by mouth At Bedtime       [DISCONTINUED] SUMAtriptan (IMITREX) 20 MG/ACT nasal spray Spray 1 spray in nostril as needed for migraine       No facility-administered encounter medications on file as of 3/3/2022.        Not on File     Review of systems:  A full 10 point review of systems was obtained and was negative except for the pertinent positives and negatives stated within the HPI.    Objective Findings:   Physical Exam:    Telephone consultation    Labs, Radiology, Pathology     No results found for: WBC, HGB, PLT, CHOL, TRIG, HDL, LDLDIRECT, ALT, AST, NA, CREATININE, BUN, CO2, TSH, INR, GLUF     Liver Function Studies - No results for input(s): PROTTOTAL, ALBUMIN, BILITOTAL, ALKPHOS, AST, ALT, BILIDIRECT in the last 41644 hours.       Patient Active Problem List    Diagnosis Date Noted     Chronic abdominal pain 03/03/2022     Priority: Medium     Heartburn 03/03/2022     Priority: Medium     History of Lucinda-en-Y gastric bypass 03/03/2022     Priority: Medium     Non-intractable vomiting with nausea, unspecified vomiting type 03/03/2022     Priority: Medium     History of Nissen fundoplication 03/03/2022     Priority: Medium      Assessment and Plan   Assessment:    Joseph Perdomo is a 61 year old male with a significant past medical history noted below following the HPI who is presenting as a new patient in consultation at the request of Dr. Early with a chief complaint of heartburn/reflux and chronic abdominal pain.    The patient was seen as a  telephone consultation regarding abdominal pain and heartburn.  Despite having years of chronic abdominal pain the patient's feels that his biggest symptom is his heartburn.  He feels that his heartburn is occurring despite having a Lucinda-en-Y following infected mesh as well as a Nissen fundoplication.  He is currently taking omeprazole twice daily without any relief.  I have asked that he begin taking AcipHex high-dose twice daily instead of omeprazole to determine if he has better benefit.  He was instructed to send me a [employer] message when he is able to obtain this medication which I prescribed today.  1 month later he will send a repeat message indicating whether his symptoms are better, worse, or the same.  If his symptoms are unchanged or worse I have asked that he stop his PPI and that he undergo a 96-hour off PPI Bravo.    For his abdominal pain he has not responded to amitriptyline but his symptoms are likely related to multiple surgeries and infection.  He states that he is not mirtazapine before this may be an option.    1. Heartburn    2. Chronic abdominal pain    3. History of Lucinda-en-Y gastric bypass    4. Non-intractable vomiting with nausea, unspecified vomiting type    5. History of Nissen fundoplication       No orders of the defined types were placed in this encounter.     Plan:  1. Please begin taking aciphex (rabeprazole) 20mg twice daily 30-60 minutes before breakfast and dinner.   2. Please send me a message when you have obtained the medication and then one month later provide an update if your symptoms of reflux are the same/better/worse.  3. If your symptoms are not better we may want to pursue an upper endoscopy with 96hr OFF therapy Bravo to test how much reflux you actually have.   4. For your abdominal pain you may wish to consider mirtazapine (remeron) in the future    Follow up plan:   Return to clinic 4 months and as needed.    The risks and benefits of my recommendations, as well as  other treatment options were discussed with the patient and any available family today. All questions were answered.     o Follow up: As planned above. Today, I personally spent 32 minutes in direct telephone time with the patient, of which greater than 50% of the time was spent in patient education and counseling as described above. Approximately 26 minutes were spent on indirect care associated with the patient's consultation including but not limited to review of: patient medical records to date, clinic visits, hospital records, lab results, imaging studies, procedural documentation, and coordinating care with other providers. The findings from this review are summarized in the above note. All of the above accounted for a cumulative time of 58 minutes and was performed on the date of service.     The patient verbalized understanding of the plan and was appreciative for the time spent and information provided during the office visit.     Author:   Mateusz Al DO   of Medicine  Director, Esophageal Disorders Program  Division of Gastroenterology, Hepatology, and Nutrition  [facility]     Documentation assisted by voice recognition and documentation system.

## 2022-03-03 NOTE — LETTER
"    3/3/2022         RE: Joseph Perdomo  [address]  [place] [address]        Dear Colleague,    Thank you for referring your patient, Joseph Perdomo, to the [facility] [place]. Please see a copy of my visit note below.    Telephone visit:  Start 4:00  End 4:32    Gastroenterology Visit for: Joseph Perdomo 1960   MRN: [id number]     Reason for Visit:  chief complaint  Referred by: Sharath  / [facility]  [address] / [place] [address]  Patient Care Team:  Dunphy, Tyler J as PCP - General (Internal Medicine)  Brandon Ruff MD as MD (Surgery)    History of Present Illness:   Joseph Perdomo is a 61 year old male with a significant past medical history noted below following the HPI who is presenting as a new patient in consultation at the request of Dr. Early with a chief complaint of heartburn/reflux and chronic abdominal pain.  ---------------------------------------------------------------  Joseph Perdomo states he had mesh put in his abdomen about 15 years ago. The mesh got infected and he was septic and was in ICU for >1wk. He was having significant vomiting and reports half his stomach . They performed a berlin en y. He had diverticulitis with colectomy as well. He states that he chronically has abdominal pain and has had this pain since his infected mesh which has resulted in numerous subsequent surgeries. However, his biggest complaint that he would like addressed is his heartburn.      If he eats he has persistent belching for several hours or he throws it up. He has had \"a lot of tests\". He states that his stomach hurts all the time. The pain is described as located under his xiphoid/epigastric area. Constant pain. Worse when he eats. He states it is not associated with specific foods. If he eats ice cream his pain is really bad. About 15-20 minutes after eating he has a worsening of pain. After eating 2-3 hours of elevated pain before decreasing " back to baseline pain.     He has nausea and vomiting as well. This is associated with eating.     He states he has heartburn constantly and feels that he has reflux up his throat when laying supine. He has to take tums at night to relieve that.     He has tried omeprazole, Seroquel, Lexapro, amitriptyline, Flexeril, Compazine, Zofran, gabapentin, and hydrocodone/acetaminophen.    Not currently on opioids.     Moving bowels twice a day but ranges from normal to diarrhea.     Feels like no medications have ever helped.      History per 9/23/21  *Long history of acid reflux status post Nissen fundoplication  *Sigmoid resection for diverticular disease  *August 19, 2013, EGD. This documented extensive amount of food debris in the esophagus and gastric lumen despite the patient having been n.p.o. for 15-1/2 hours. Dilation of the pyloric channel with an 18 mm to 20 mm TTS balloon dilator system was performed. This did not result in any improvement of symptoms and signs.   *September 2013 - perforated gastric ulcer. He underwent open laparotomy with subtotal gastrectomy and Lucinda-en-Y reconstructive surgery  *September 17, 2014 -  laparoscopic incisional hernia repair with mesh material utilized for the intervention  *January 2, 2015. EGD noted postoperative changes of subtotal gastrectomy with Lucinda-en-Y anastomosis. The jejunojejunal anastomosis was never visualized with the standard endoscope  *July of 2015 signs and symptoms suggestive of proximal small bowel obstructive process noted in clinic. Dr. Rogers saw him shortly thereafter and also felt that intermittent obstruction of the jejunojejunal anastomosis was highly likely. Operative revision was recommended.   *August 5, 2015. exploratory laparotomy with enterolysis and revision of the jejunojejunal ostomy. Dr. Rogers notes on the procedure report that there were extensive adhesions  *January 12, 2016 - Enteroscopy with postoperative anatomy of gastrojejunal  anastomosis with no obvious stenosis of the gastrojejunal anastomosis, but with rather significant convolution and tortuosity encountered in the first 20 cm of the jejunal limb. Successful advancement of the pediatric colonoscope to the jejunojejunal anastomosis with what appears to be widely patent anatomy. Two limbs of small bowel transited through for an additional 40 cm as described. Solid and semisolid debris present throughout the procedure, encountered at the jejunojejunal anastomosis and in the mid functional jejunal limb between the anastomosis and the gastrojejunal anastomosis. Gastric contents present, semisolid and liquid in nature, encountered.  *January 13, 2017 - Colonoscopy normal with patent end to side colocolonic anastomosis  *September 22, 2017 HIDA scan normal  *November 29, 2017-upper endoscopy with normal esophagus, subtotal gastrectomy changes with small amount of food residue. Widely patent gastrojejunal anastomosis. Short 10 cm of jejunal limb normal mucosa. Long segment of jejunum to operative anastomosis with 2 lm identified. One lumen could be advanced into for 15 cm second lumen could not be advanced into and inspected. Small amount of old food material noted in this region.  *December 11, 2017-trigger point injection for abdominal wall pain syndrome. No clinical improvement noted  *July 17, 2018-patient evaluated by infectious disease specialists for night sweats, lack of energy, lack of sleep and abdominal pain. Urine histoplasma antigen, Blastomyces antigen, blood cultures, hepatitis C antibody, HIV antibody, treponema antibody, Brucella antibody, Q fever antibody, tick borne disease panel were all unremarkable.  *10/8/2018-gastric emptying study-delayed gastric emptying  *October 12, 2018-patient was seen by general surgery at [facility] in [place] who discussed possibility of chronic mesh infection with infectious disease specialist. Gadolinium scan had been done in September  and showed some activity at the upper part of his prior hernia repair. Plan is in process to remove surgical mesh.  Risk   *November 1, 2018-initial GI consultation with myself. Trial of rifaximin for possible SIBO with no response after 10 days.  * November 28, 2018 patient went to the operating room and noted mesh failure. Mesh was removed. A supra mesh hernia was noted this was repaired. Lysis of adhesions. Patient was discharged on December 1, 2018 with oxycodone for pain control  *December 3, 2018-presented to the emergency department nausea vomiting. Was given IV fluids, Dilaudid and started on twice daily Reglan.  *December 10, 2018-again presented to the emergency department with abdominal pain, nausea and vomiting. CBC, BMP, hepatic panel and lipase all unremarkable. CT abdomen and pelvis with no acute changes.  *2/26/2019-EGD-normal esophagus. Gastroenterostomy was found and healthy-appearing. Widely patent enteroenterostomy with healthy-appearing mucosa in the jejunum. There was some bilious fluid in the alimentary limb which I suspect is due to short limb rather than downstream obstruction given numerous cross-sectional imaging exams.  *4/6/2019-CT abdomen/pelvis without contrast-nonobstructing nephrolithiasis. Multiple sites of prior postoperative change. No evidence for bowel obstruction, abscess or free air.  *5/28/2019-CTA chest-no evidence for PE. No focal infiltrates.  *7/26/2019-CT abdomen/pelvis without contrast-obstructing 3 mm right ureteral stone just distal ureteral pelvic junction with mild hydronephrosis. Obstructing 2 mm left renal stone with associated mild hydronephrosis.  *8/20/2019-CT abdomen/pelvis with IV contrast-no acute findings. Postoperative changes of Lucinda-en-Y gastric bypass, hemicolectomy, appendectomy, sigmoid colon resection with colocolonic anastomosis.  *11/16/2019-CT abdomen/pelvis with IV contrast-acute uncomplicated diverticulitis of the descending  colon.  *12/26/2019-CT abdomen/pelvis with IV contrast-acute diverticulitis of the sigmoid colon.  *1/3/2020-CT abdomen/pelvis with IV contrast-near complete resolution of changes of acute diverticulitis. No abscess or fluid collection.  *1/25/2020-CT abdomen/pelvis with IV contrast-nothing acute in abdomen or pelvis.  *2/4/2020-CT abdomen pelvis without IV contrast-stable exam. No interval changes.  *3/13/6832-XIG-rpmuibbq LES otherwise normal with evidence of gastric bypass. Gastric pouch with large size and intact staple line. Colonoscopy-patent end-to-side colocolonic anastomosis which was healthy-appearing. Mild diverticulosis in the left colon with active diverticulitis. Exam otherwise normal  *4/13/2020-CT abdomen/pelvis without IV contrast-interval development of 2 small ventral hernias to the left of the midline near the umbilicus. Findings are consistent with incisional hernias. These findings may contribute to a partial small bowel obstruction.  *5/22/2020-CT abdomen/pelvis without IV contrast-mild right hydronephrosis secondary to a 2 to 3 mm stone in the proximal ureter. Residual punctate calculus involving lower pole of right kidney.  *6/13/2020-CT abdomen/pelvis without IV contrast-nothing acute  *7/31/2020-CT abdomen/pelvis with IV contrast-new postoperative changes of ventral hernia repair. Moderate amount of blood products insinuating within the rectus musculature bilaterally as well as more discrete hematoma within the subcutaneous tissue along the midline. Small amount of intraperitoneal blood products in the pelvis. New mild dilation of several loops of small bowel in the anterior abdomen. Findings favored to be secondary to ileus rather than obstruction.  *8/7/2020-CT abdomen/pelvis without IV contrast-large anterior wall presumed hematoma increased in size now measuring 15 cm. Small amount of presumed blood in pelvis is decreased. Tiny gallstone in gallbladder with distention but no signs of  acute cholecystitis.  *8/23/2020-CT abdomen/pelvis with IV contrast-increased loculation and new gas within the anterior abdominal wall collection/hematoma. Findings suspicious for infected collection. Mild dilatation of intra and extrahepatic bile ducts, nonspecific.  *11/10/2020-CT abdomen/pelvis with IV contrast-interval decrease in size of the collection within the anterior bowel wall, today measuring 16 x 41 x 51 mm. Remains suspicious for an abscess given the peripheral enhancement. No acute intra-abdominal findings.  *1/12/2021-CT abdomen/pelvis with IV contrast-fluid collection in anterior abdominal wall is resolved. Diverticulosis in the distal descending colon. No evidence for diverticulitis.   *2/22/2021-CT abdomen/pelvis with IV contrast-no acute findings  *3/2/2021-transversus abdominis nerve block with pain management.  *4/27/2021-CT abdomen/pelvis without IV contrast-no acute findings. No direct findings for left-sided pain.  *6/3/2021-CT abdomen/pelvis without IV contrast-postoperative changes at the level of the cecum, stomach and proximal small bowel. Sigmoid colon anastomosis. Stable distended loop of small bowel adjacent to the distal anastomosis. Overall stable exam compared to prior.  *7/8/2021-CT abdomen/pelvis with IV contrast-no evidence of bowel obstruction.  *8/1/2021-abdominal x-ray-nonobstructive bowel gas pattern. Moderate amount of stool throughout the colon. Ventral hernia repair.  *9/20/2021-neurology evaluation-diagnosed with chronic fatigue syndrome. Started on amitriptyline 25 mg at bedtime.      ------------------------------------------------  Wt Readings from Last 5 Encounters:   03/27/17 102.8 kg (226 lb 9.6 oz)        Esophageal Questionnaire(s)    BEDQ Questionnaire  No flowsheet data found.  No flowsheet data found.    Eckardt Questionnaire  No flowsheet data found.    Promis 10 Questionnaire  No flowsheet data found.        STUDIES & PROCEDURES:    EGD:    Date:  Impression:  Pathology Report:    Colonoscopy:  Date:  Impression:  Pathology Report:     EndoFLIP directed at the UES or LES (8cm (EF-325) balloon length or 16cm (EF-322) balloon length):   Date:  8cm balloon  Balloon inflation Balloon pressure CSA (mm^2) DI (mm^2/mmHg) Dmin (mm) Compliance   20 (ladmark ID)        30        40        50           16cm balloon  Balloon inflation Balloon pressure CSA (mm^2) DI (mm^2/mmHg) Dmin (mm) Compliance   30 (ladmark ID)        40        50        60        70           High Resolution Manometry:  Date:  Impression:    PH/Impedance:  Date:  Impression:     Bravo:  48 or 96hr  Date:  Impression:    CT:  Date:  Impression:    Esophagram:  Date:  Impression:     Prior medical records were reviewed including, but not limited to, notes from referring providers, lab work, radiographic tests, and other diagnostic tests. Pertinent results were summarized above.     History   No past medical history on file.    No past surgical history on file.    Social History     Socioeconomic History     Marital status:      Spouse name: Not on file     Number of children: Not on file     Years of education: Not on file     Highest education level: Not on file   Occupational History     Not on file   Tobacco Use     Smoking status: Current Every Day Smoker     Smokeless tobacco: Not on file   Substance and Sexual Activity     Alcohol use: Not on file     Drug use: Not on file     Sexual activity: Not on file   Other Topics Concern     Not on file   Social History Narrative     Not on file     Social Determinants of Health     Financial Resource Strain: Not on file   Food Insecurity: Not on file   Transportation Needs: Not on file   Physical Activity: Not on file   Stress: Not on file   Social Connections: Not on file   Intimate Partner Violence: Not on file   Housing Stability: Not on file       No family history on file.  Family history reviewed and edited as  appropriate    Medications and Allergies:     Outpatient Encounter Medications as of 3/3/2022   Medication Sig Dispense Refill     Omeprazole (PRILOSEC PO) Take 40 mg by mouth 2 times daily (before meals)       Ondansetron (ZOFRAN ODT PO) Take 4 mg by mouth every 8 hours as needed for nausea       Oxybutynin Chloride (DITROPAN PO) Take 5 mg by mouth 3 times daily       Pregabalin (LYRICA PO) Take 100 mg by mouth 3 times daily       Simvastatin (ZOCOR PO) Take 20 mg by mouth At Bedtime       sucralfate (CARAFATE) 1 GM tablet Take 1 g by mouth 4 times daily       SUMAtriptan Succinate Refill (IMITREX) 6 MG/0.5ML SOCT Inject Subcutaneous once       Topiramate (TOPAMAX PO) Take 25 mg by mouth 2 times daily       Zolpidem Tartrate (AMBIEN PO) Take 10 mg by mouth At Bedtime       [DISCONTINUED] SUMAtriptan (IMITREX) 20 MG/ACT nasal spray Spray 1 spray in nostril as needed for migraine       No facility-administered encounter medications on file as of 3/3/2022.        Not on File     Review of systems:  A full 10 point review of systems was obtained and was negative except for the pertinent positives and negatives stated within the HPI.    Objective Findings:   Physical Exam:    Telephone consultation    Labs, Radiology, Pathology     No results found for: WBC, HGB, PLT, CHOL, TRIG, HDL, LDLDIRECT, ALT, AST, NA, CREATININE, BUN, CO2, TSH, INR, GLUF     Liver Function Studies - No results for input(s): PROTTOTAL, ALBUMIN, BILITOTAL, ALKPHOS, AST, ALT, BILIDIRECT in the last 16972 hours.       Patient Active Problem List    Diagnosis Date Noted     Chronic abdominal pain 03/03/2022     Priority: Medium     Heartburn 03/03/2022     Priority: Medium     History of Lucinda-en-Y gastric bypass 03/03/2022     Priority: Medium     Non-intractable vomiting with nausea, unspecified vomiting type 03/03/2022     Priority: Medium     History of Nissen fundoplication 03/03/2022     Priority: Medium      Assessment and Plan   Assessment:     Joseph Perdomo is a 61 year old male with a significant past medical history noted below following the HPI who is presenting as a new patient in consultation at the request of Dr. Early with a chief complaint of heartburn/reflux and chronic abdominal pain.    The patient was seen as a telephone consultation regarding abdominal pain and heartburn.  Despite having years of chronic abdominal pain the patient's feels that his biggest symptom is his heartburn.  He feels that his heartburn is occurring despite having a Lucinda-en-Y following infected mesh as well as a Nissen fundoplication.  He is currently taking omeprazole twice daily without any relief.  I have asked that he begin taking AcipHex high-dose twice daily instead of omeprazole to determine if he has better benefit.  He was instructed to send me a [employer] message when he is able to obtain this medication which I prescribed today.  1 month later he will send a repeat message indicating whether his symptoms are better, worse, or the same.  If his symptoms are unchanged or worse I have asked that he stop his PPI and that he undergo a 96-hour off PPI Bravo.    For his abdominal pain he has not responded to amitriptyline but his symptoms are likely related to multiple surgeries and infection.  He states that he is not mirtazapine before this may be an option.    1. Heartburn    2. Chronic abdominal pain    3. History of Lucinda-en-Y gastric bypass    4. Non-intractable vomiting with nausea, unspecified vomiting type    5. History of Nissen fundoplication       No orders of the defined types were placed in this encounter.     Plan:  1. Please begin taking aciphex (rabeprazole) 20mg twice daily 30-60 minutes before breakfast and dinner.   2. Please send me a message when you have obtained the medication and then one month later provide an update if your symptoms of reflux are the same/better/worse.  3. If your symptoms are not better we may want to pursue an upper  endoscopy with 96hr OFF therapy Bravo to test how much reflux you actually have.   4. For your abdominal pain you may wish to consider mirtazapine (remeron) in the future    Follow up plan:   Return to clinic 4 months and as needed.    The risks and benefits of my recommendations, as well as other treatment options were discussed with the patient and any available family today. All questions were answered.     o Follow up: As planned above. Today, I personally spent 32 minutes in direct telephone time with the patient, of which greater than 50% of the time was spent in patient education and counseling as described above. Approximately 26 minutes were spent on indirect care associated with the patient's consultation including but not limited to review of: patient medical records to date, clinic visits, hospital records, lab results, imaging studies, procedural documentation, and coordinating care with other providers. The findings from this review are summarized in the above note. All of the above accounted for a cumulative time of 58 minutes and was performed on the date of service.     The patient verbalized understanding of the plan and was appreciative for the time spent and information provided during the office visit.     Author:   Mateusz Al DO   of Medicine  Director, Esophageal Disorders Program  Division of Gastroenterology, Hepatology, and Nutrition  [facility]    Documentation assisted by voice recognition and documentation system.

## 2022-03-07 ENCOUNTER — PATIENT OUTREACH (OUTPATIENT)
Dept: GASTROENTEROLOGY | Facility: CLINIC | Age: 62
End: 2022-03-07
Payer: COMMERCIAL

## 2022-03-07 DIAGNOSIS — R12 HEARTBURN: Primary | ICD-10-CM

## 2022-03-07 RX ORDER — RABEPRAZOLE SODIUM 20 MG/1
20 TABLET, DELAYED RELEASE ORAL
Qty: 180 TABLET | Refills: 1 | Status: SHIPPED | OUTPATIENT
Start: 2022-03-07 | End: 2022-11-20

## 2022-03-07 NOTE — TELEPHONE ENCOUNTER
Spoke with pt to confirm  Medication sent to pt's Pharmacy  [facility]   [address]

## 2022-06-28 ENCOUNTER — TELEPHONE (OUTPATIENT)
Dept: GASTROENTEROLOGY | Facility: CLINIC | Age: 62
End: 2022-06-28

## 2022-06-28 NOTE — TELEPHONE ENCOUNTER
Called to remind patient of their upcoming appointment with our GI clinic, on 7/5/2022 at 1000 with Anabella Ricci. This appointment is scheduled as a telephone visit. You will receive a call approximately 30 minutes prior to check you in, you must be in [place] for this visit., if your appointment is virtual (video or telephone) you need to be in [place] for the visit. To reschedule or cancel patient to call [phone].    Kitty Duran, [facility] [address]

## 2022-07-05 ENCOUNTER — VIRTUAL VISIT (OUTPATIENT)
Dept: GASTROENTEROLOGY | Facility: CLINIC | Age: 62
End: 2022-07-05
Payer: COMMERCIAL

## 2022-07-05 VITALS — WEIGHT: 206 LBS | BODY MASS INDEX: 27.94 KG/M2

## 2022-07-05 DIAGNOSIS — R11.2 NON-INTRACTABLE VOMITING WITH NAUSEA, UNSPECIFIED VOMITING TYPE: ICD-10-CM

## 2022-07-05 DIAGNOSIS — R12 HEARTBURN: ICD-10-CM

## 2022-07-05 DIAGNOSIS — G89.29 CHRONIC ABDOMINAL PAIN: Primary | ICD-10-CM

## 2022-07-05 DIAGNOSIS — R10.9 CHRONIC ABDOMINAL PAIN: Primary | ICD-10-CM

## 2022-07-05 PROCEDURE — 99442 PR PHYSICIAN TELEPHONE EVALUATION 11-20 MIN: CPT | Mod: 95 | Performed by: PHYSICIAN ASSISTANT

## 2022-07-05 ASSESSMENT — PAIN SCALES - GENERAL: PAINLEVEL: MODERATE PAIN (4)

## 2022-07-05 NOTE — PROGRESS NOTES
"Joseph is a 61 year old who is being evaluated via a billable telephone visit.      What phone number would you like to be contacted at? [phone]  How would you like to obtain your AVS? Mail a copy  Phone call duration: 15minutes      Gastroenterology Visit for: Joseph Perdomo 1960   MRN: [id number]     Reason for Visit:  chief complaint    Referred by: [facility]  / [facility]  [address] / [facility] [id number]  Patient Care Team:  Dunphy, Tyler J as PCP - General (Internal Medicine)  Brandon Ruff MD as MD (Surgery)  Mateusz Al DO as Assigned Gastroenterology Provider    History of Present Illness:   Joseph Perdomo is a 61 year old male with a significant past medical history noted below following the HPI who is presenting in follow-up for symptoms of heartburn/reflux and chronic abdominal pain.  ---------------------------------------------------------------  Joseph Perdomo states he had mesh put in his abdomen about 15 years ago. The mesh got infected and he was septic and was in ICU for >1wk. He was having significant vomiting and reports half his stomach . They performed a berlin en y. He had diverticulitis with colectomy as well. He states that he chronically has abdominal pain and has had this pain since his infected mesh which has resulted in numerous subsequent surgeries. However, his biggest complaint that he would like addressed is his heartburn.      If he eats he has persistent belching for several hours or he throws it up. He has had \"a lot of tests\". He states that his stomach hurts all the time. The pain is described as located under his xiphoid/epigastric area. Constant pain. Worse when he eats. He states it is not associated with specific foods. If he eats ice cream his pain is really bad. About 15-20 minutes after eating he has a worsening of pain. After eating 2-3 hours of elevated pain before decreasing back to baseline pain.     He has nausea and vomiting as well. This " is associated with eating.     He states he has heartburn constantly and feels that he has reflux up his throat when laying supine. He has to take tums at night to relieve that.     He has tried omeprazole, Seroquel, Lexapro, amitriptyline, Flexeril, Compazine, Zofran, gabapentin, and hydrocodone/acetaminophen.    Not currently on opioids.     Moving bowels twice a day but ranges from normal to diarrhea.     Feels like no medications have ever helped.        Interval history July 5, 2022    Since starting rabeprazole 20 mg BID, he states that he heartburn has improved somewhat. He states that prior to Aciphex, he was waking up nightly with heartburn and reflux, now occurring a few times per month. He states no heartburn or reflux during the day. He states he has instead been dealing with more abdominal pain issues. Since last visit, he has had at least 4+ ED visits for chronic abdominal pain, as well as Laparoscopic common bile duct exploration with spyglass, Resection of cystic duct stump, Laparoscopic lysis of adhesions.  He has had multiple CT scans which have been largely unrevealing. He states that he thinks that the pain is related to the mesh. He has ongoing nausea and vomiting occurring three to four times per week, most of the time postprandial, and occasional in the AM upon waking. After meals, the emesis occurs within 30 minutes or so, undigested and some digested, acidic material. Significant early satiety is present, associated with self-reported weight loss. As noted below, delayed gastric emptying noted in 2018. He has been working with his GI MD locally, tested for celiac, which was negative.    History per 9/23/21  *Long history of acid reflux status post Nissen fundoplication  *Sigmoid resection for diverticular disease  *August 19, 2013, EGD. This documented extensive amount of food debris in the esophagus and gastric lumen despite the patient having been n.p.o. for 15-1/2 hours. Dilation of the  pyloric channel with an 18 mm to 20 mm TTS balloon dilator system was performed. This did not result in any improvement of symptoms and signs.   *September 2013 - perforated gastric ulcer. He underwent open laparotomy with subtotal gastrectomy and Lucinda-en-Y reconstructive surgery  *September 17, 2014 -  laparoscopic incisional hernia repair with mesh material utilized for the intervention  *January 2, 2015. EGD noted postoperative changes of subtotal gastrectomy with Lucinda-en-Y anastomosis. The jejunojejunal anastomosis was never visualized with the standard endoscope  *July of 2015 signs and symptoms suggestive of proximal small bowel obstructive process noted in clinic. Dr. Rogers saw him shortly thereafter and also felt that intermittent obstruction of the jejunojejunal anastomosis was highly likely. Operative revision was recommended.   *August 5, 2015. exploratory laparotomy with enterolysis and revision of the jejunojejunal ostomy. Dr. Rogers notes on the procedure report that there were extensive adhesions  *January 12, 2016 - Enteroscopy with postoperative anatomy of gastrojejunal anastomosis with no obvious stenosis of the gastrojejunal anastomosis, but with rather significant convolution and tortuosity encountered in the first 20 cm of the jejunal limb. Successful advancement of the pediatric colonoscope to the jejunojejunal anastomosis with what appears to be widely patent anatomy. Two limbs of small bowel transited through for an additional 40 cm as described. Solid and semisolid debris present throughout the procedure, encountered at the jejunojejunal anastomosis and in the mid functional jejunal limb between the anastomosis and the gastrojejunal anastomosis. Gastric contents present, semisolid and liquid in nature, encountered.  *January 13, 2017 - Colonoscopy normal with patent end to side colocolonic anastomosis  *September 22, 2017 HIDA scan normal  *November 29, 2017-upper endoscopy with normal  esophagus, subtotal gastrectomy changes with small amount of food residue. Widely patent gastrojejunal anastomosis. Short 10 cm of jejunal limb normal mucosa. Long segment of jejunum to operative anastomosis with 2 lm identified. One lumen could be advanced into for 15 cm second lumen could not be advanced into and inspected. Small amount of old food material noted in this region.  *December 11, 2017-trigger point injection for abdominal wall pain syndrome. No clinical improvement noted  *July 17, 2018-patient evaluated by infectious disease specialists for night sweats, lack of energy, lack of sleep and abdominal pain. Urine histoplasma antigen, Blastomyces antigen, blood cultures, hepatitis C antibody, HIV antibody, treponema antibody, Brucella antibody, Q fever antibody, tick borne disease panel were all unremarkable.  *10/8/2018-gastric emptying study-delayed gastric emptying  *October 12, 2018-patient was seen by general surgery at [facility] in [place] who discussed possibility of chronic mesh infection with infectious disease specialist. Gadolinium scan had been done in September and showed some activity at the upper part of his prior hernia repair. Plan is in process to remove surgical mesh.  Risk   *November 1, 2018-initial GI consultation with myself. Trial of rifaximin for possible SIBO with no response after 10 days.  * November 28, 2018 patient went to the operating room and noted mesh failure. Mesh was removed. A supra mesh hernia was noted this was repaired. Lysis of adhesions. Patient was discharged on December 1, 2018 with oxycodone for pain control  *December 3, 2018-presented to the emergency department nausea vomiting. Was given IV fluids, Dilaudid and started on twice daily Reglan.  *December 10, 2018-again presented to the emergency department with abdominal pain, nausea and vomiting. CBC, BMP, hepatic panel and lipase all unremarkable. CT abdomen and pelvis with no acute  changes.  *2/26/2019-EGD-normal esophagus. Gastroenterostomy was found and healthy-appearing. Widely patent enteroenterostomy with healthy-appearing mucosa in the jejunum. There was some bilious fluid in the alimentary limb which I suspect is due to short limb rather than downstream obstruction given numerous cross-sectional imaging exams.  *4/6/2019-CT abdomen/pelvis without contrast-nonobstructing nephrolithiasis. Multiple sites of prior postoperative change. No evidence for bowel obstruction, abscess or free air.  *5/28/2019-CTA chest-no evidence for PE. No focal infiltrates.  *7/26/2019-CT abdomen/pelvis without contrast-obstructing 3 mm right ureteral stone just distal ureteral pelvic junction with mild hydronephrosis. Obstructing 2 mm left renal stone with associated mild hydronephrosis.  *8/20/2019-CT abdomen/pelvis with IV contrast-no acute findings. Postoperative changes of Lucinda-en-Y gastric bypass, hemicolectomy, appendectomy, sigmoid colon resection with colocolonic anastomosis.  *11/16/2019-CT abdomen/pelvis with IV contrast-acute uncomplicated diverticulitis of the descending colon.  *12/26/2019-CT abdomen/pelvis with IV contrast-acute diverticulitis of the sigmoid colon.  *1/3/2020-CT abdomen/pelvis with IV contrast-near complete resolution of changes of acute diverticulitis. No abscess or fluid collection.  *1/25/2020-CT abdomen/pelvis with IV contrast-nothing acute in abdomen or pelvis.  *2/4/2020-CT abdomen pelvis without IV contrast-stable exam. No interval changes.  *3/13/5539-ESS-shbiyzaj LES otherwise normal with evidence of gastric bypass. Gastric pouch with large size and intact staple line. Colonoscopy-patent end-to-side colocolonic anastomosis which was healthy-appearing. Mild diverticulosis in the left colon with active diverticulitis. Exam otherwise normal  *4/13/2020-CT abdomen/pelvis without IV contrast-interval development of 2 small ventral hernias to the left of the midline near the  umbilicus. Findings are consistent with incisional hernias. These findings may contribute to a partial small bowel obstruction.  *5/22/2020-CT abdomen/pelvis without IV contrast-mild right hydronephrosis secondary to a 2 to 3 mm stone in the proximal ureter. Residual punctate calculus involving lower pole of right kidney.  *6/13/2020-CT abdomen/pelvis without IV contrast-nothing acute  *7/31/2020-CT abdomen/pelvis with IV contrast-new postoperative changes of ventral hernia repair. Moderate amount of blood products insinuating within the rectus musculature bilaterally as well as more discrete hematoma within the subcutaneous tissue along the midline. Small amount of intraperitoneal blood products in the pelvis. New mild dilation of several loops of small bowel in the anterior abdomen. Findings favored to be secondary to ileus rather than obstruction.  *8/7/2020-CT abdomen/pelvis without IV contrast-large anterior wall presumed hematoma increased in size now measuring 15 cm. Small amount of presumed blood in pelvis is decreased. Tiny gallstone in gallbladder with distention but no signs of acute cholecystitis.  *8/23/2020-CT abdomen/pelvis with IV contrast-increased loculation and new gas within the anterior abdominal wall collection/hematoma. Findings suspicious for infected collection. Mild dilatation of intra and extrahepatic bile ducts, nonspecific.  *11/10/2020-CT abdomen/pelvis with IV contrast-interval decrease in size of the collection within the anterior bowel wall, today measuring 16 x 41 x 51 mm. Remains suspicious for an abscess given the peripheral enhancement. No acute intra-abdominal findings.  *1/12/2021-CT abdomen/pelvis with IV contrast-fluid collection in anterior abdominal wall is resolved. Diverticulosis in the distal descending colon. No evidence for diverticulitis.   *2/22/2021-CT abdomen/pelvis with IV contrast-no acute findings  *3/2/2021-transversus abdominis nerve block with pain  management.  *4/27/2021-CT abdomen/pelvis without IV contrast-no acute findings. No direct findings for left-sided pain.  *6/3/2021-CT abdomen/pelvis without IV contrast-postoperative changes at the level of the cecum, stomach and proximal small bowel. Sigmoid colon anastomosis. Stable distended loop of small bowel adjacent to the distal anastomosis. Overall stable exam compared to prior.  *7/8/2021-CT abdomen/pelvis with IV contrast-no evidence of bowel obstruction.  *8/1/2021-abdominal x-ray-nonobstructive bowel gas pattern. Moderate amount of stool throughout the colon. Ventral hernia repair.  *9/20/2021-neurology evaluation-diagnosed with chronic fatigue syndrome. Started on amitriptyline 25 mg at bedtime.      ------------------------------------------------  Wt Readings from Last 5 Encounters:   03/27/17 102.8 kg (226 lb 9.6 oz)        Esophageal Questionnaire(s)    BEDQ Questionnaire  No flowsheet data found.  No flowsheet data found.    Eckardt Questionnaire  No flowsheet data found.    Promis 10 Questionnaire  No flowsheet data found.        STUDIES & PROCEDURES:    EGD:   Date:  Impression:  Pathology Report:    Colonoscopy:  Date:  Impression:  Pathology Report:     EndoFLIP directed at the UES or LES (8cm (EF-325) balloon length or 16cm (EF-322) balloon length):   Date:  8cm balloon  Balloon inflation Balloon pressure CSA (mm^2) DI (mm^2/mmHg) Dmin (mm) Compliance   20 (ladmark ID)        30        40        50           16cm balloon  Balloon inflation Balloon pressure CSA (mm^2) DI (mm^2/mmHg) Dmin (mm) Compliance   30 (ladmark ID)        40        50        60        70           High Resolution Manometry:  Date:  Impression:    PH/Impedance:  Date:  Impression:     Bravo:  48 or 96hr  Date:  Impression:    CT:  Date:  Impression:    Esophagram:  Date:  Impression:     Prior medical records were reviewed including, but not limited to, notes from referring providers, lab work, radiographic tests, and  other diagnostic tests. Pertinent results were summarized above.     History   No past medical history on file.    No past surgical history on file.    Social History     Socioeconomic History     Marital status:      Spouse name: Not on file     Number of children: Not on file     Years of education: Not on file     Highest education level: Not on file   Occupational History     Not on file   Tobacco Use     Smoking status: Current Every Day Smoker     Smokeless tobacco: Not on file   Substance and Sexual Activity     Alcohol use: Not on file     Drug use: Not on file     Sexual activity: Not on file   Other Topics Concern     Not on file   Social History Narrative     Not on file     Social Determinants of Health     Financial Resource Strain: Not on file   Food Insecurity: Not on file   Transportation Needs: Not on file   Physical Activity: Not on file   Stress: Not on file   Social Connections: Not on file   Intimate Partner Violence: Not on file   Housing Stability: Not on file       No family history on file.  Family history reviewed and edited as appropriate    Medications and Allergies:     Outpatient Encounter Medications as of 7/5/2022   Medication Sig Dispense Refill     Omeprazole (PRILOSEC PO) Take 40 mg by mouth 2 times daily (before meals)       Ondansetron (ZOFRAN ODT PO) Take 4 mg by mouth every 8 hours as needed for nausea       Oxybutynin Chloride (DITROPAN PO) Take 5 mg by mouth 3 times daily       Pregabalin (LYRICA PO) Take 100 mg by mouth 3 times daily       RABEprazole (ACIPHEX) 20 MG EC tablet Take 1 tablet (20 mg) by mouth 2 times daily (before meals) 180 tablet 1     Simvastatin (ZOCOR PO) Take 20 mg by mouth At Bedtime       sucralfate (CARAFATE) 1 GM tablet Take 1 g by mouth 4 times daily       SUMAtriptan Succinate Refill (IMITREX) 6 MG/0.5ML SOCT Inject Subcutaneous once       Topiramate (TOPAMAX PO) Take 25 mg by mouth 2 times daily       Zolpidem Tartrate (AMBIEN PO) Take 10  mg by mouth At Bedtime       No facility-administered encounter medications on file as of 7/5/2022.        Not on File     Review of systems:  A full 10 point review of systems was obtained and was negative except for the pertinent positives and negatives stated within the HPI.    Objective Findings:   Physical Exam:    Telephone consultation    Labs, Radiology, Pathology     No results found for: WBC, HGB, PLT, CHOL, TRIG, HDL, LDLDIRECT, ALT, AST, NA, CREATININE, BUN, CO2, TSH, INR, GLUF     Liver Function Studies - No results for input(s): PROTTOTAL, ALBUMIN, BILITOTAL, ALKPHOS, AST, ALT, BILIDIRECT in the last 54180 hours.       Patient Active Problem List    Diagnosis Date Noted     Chronic abdominal pain 03/03/2022     Priority: Medium     Heartburn 03/03/2022     Priority: Medium     History of Lucinda-en-Y gastric bypass 03/03/2022     Priority: Medium     Non-intractable vomiting with nausea, unspecified vomiting type 03/03/2022     Priority: Medium     History of Nissen fundoplication 03/03/2022     Priority: Medium      Assessment and Plan   Assessment:    Joseph Perdomo is a 61 year old male with a significant past medical history noted below following the HPI who is presenting as a new patient in consultation at the request of Dr. Early with a chief complaint of heartburn/reflux and chronic abdominal pain.    The patient was seen as a telephone follow-up for abdominal pain and heartburn. Since starting Aciphex, he has had significant improvement in heartburn symptoms, now occurring only several times per month. Plan to continue this for now. If symptoms worsen again, would recommend stopping PPI and undergo a 96-hour off PPI Bravo.    In regards to chronic abdominal pain, this has been evaluated with recent laparoscopy and numerous cross sectional studies within the last few months. The pain is largely postprandial and associated with early satiety, postprandial nausea and vomiting, with self-reported  weight loss. He is following for this with his local gastroenterology clinic and wishes to continue evaluations locally for convenience. We discussed considering a gastric emptying study and repeat EGD. He has no follow-up with them scheduled, encouraged him to call to schedule a follow-up.    No diagnosis found.   No orders of the defined types were placed in this encounter.     Plan:  1. Continue taking aciphex (rabeprazole) 20mg twice daily 30-60 minutes before breakfast and dinner.   2. If your symptoms are not better we may want to pursue an upper endoscopy with 96hr OFF therapy Bravo to test how much reflux you actually have.   3. Continue following with local gastroenterologist, please call to schedule a follow-up  4. For your abdominal pain you may wish to consider mirtazapine (remeron) in the future      Follow up plan:   Return to clinic as needed, continue cares with local GI    The risks and benefits of my recommendations, as well as other treatment options were discussed with the patient and any available family today. All questions were answered.     Follow up: As planned above. Today, I personally spent 15minutes in direct telephone time with the patient, of which greater than 50% of the time was spent in patient education and counseling as described above. Approximately 27 minutes were spent on indirect care associated with the patient's consultation including but not limited to review of: patient medical records to date, clinic visits, hospital records, lab results, imaging studies, procedural documentation, and coordinating care with other providers.     The patient verbalized understanding of the plan and was appreciative for the time spent and information provided during the office visit.     Author:   Anabella Ricci PA-C  Division of Gastroenterology, Hepatology, and Nutrition  [facility]     Documentation assisted by voice recognition and documentation system.

## 2022-07-05 NOTE — LETTER
"    2022         RE: Joseph Perdomo  [address]  [place] [address]        Dear Colleague,    Thank you for referring your patient, Joseph Perdomo, to the [facility] [place]. Please see a copy of my visit note below.    Gastroenterology Visit for: Joseph Perdomo 1960   MRN: [id number]     Reason for Visit:  chief complaint    Referred by: [facility]  / [facility]  [address] / [place] [address]  Patient Care Team:  Dunphy, Tyler J as PCP - General (Internal Medicine)  Brandon Ruff MD as MD (Surgery)  Mateusz Al DO as Assigned Gastroenterology Provider    History of Present Illness:   Joseph Perdomo is a 61 year old male with a significant past medical history noted below following the HPI who is presenting in follow-up for symptoms of heartburn/reflux and chronic abdominal pain.  ---------------------------------------------------------------  Joseph Perdomo states he had mesh put in his abdomen about 15 years ago. The mesh got infected and he was septic and was in ICU for >1wk. He was having significant vomiting and reports half his stomach . They performed a berlin en y. He had diverticulitis with colectomy as well. He states that he chronically has abdominal pain and has had this pain since his infected mesh which has resulted in numerous subsequent surgeries. However, his biggest complaint that he would like addressed is his heartburn.      If he eats he has persistent belching for several hours or he throws it up. He has had \"a lot of tests\". He states that his stomach hurts all the time. The pain is described as located under his xiphoid/epigastric area. Constant pain. Worse when he eats. He states it is not associated with specific foods. If he eats ice cream his pain is really bad. About 15-20 minutes after eating he has a worsening of pain. After eating 2-3 hours of elevated pain before decreasing back to baseline pain.     He has nausea " and vomiting as well. This is associated with eating.     He states he has heartburn constantly and feels that he has reflux up his throat when laying supine. He has to take tums at night to relieve that.     He has tried omeprazole, Seroquel, Lexapro, amitriptyline, Flexeril, Compazine, Zofran, gabapentin, and hydrocodone/acetaminophen.    Not currently on opioids.     Moving bowels twice a day but ranges from normal to diarrhea.     Feels like no medications have ever helped.        Interval history July 5, 2022    Since starting rabeprazole 20 mg BID, he states that he heartburn has improved somewhat. He states that prior to Aciphex, he was waking up nightly with heartburn and reflux, now occurring a few times per month. He states no heartburn or reflux during the day. He states he has instead been dealing with more abdominal pain issues. Since last visit, he has had at least 4+ ED visits for chronic abdominal pain, as well as Laparoscopic common bile duct exploration with spyglass, Resection of cystic duct stump, Laparoscopic lysis of adhesions.  He has had multiple CT scans which have been largely unrevealing. He states that he thinks that the pain is related to the mesh. He has ongoing nausea and vomiting occurring three to four times per week, most of the time postprandial, and occasional in the AM upon waking. After meals, the emesis occurs within 30 minutes or so, undigested and some digested, acidic material. Significant early satiety is present, associated with self-reported weight loss. As noted below, delayed gastric emptying noted in 2018. He has been working with his GI MD locally, tested for celiac, which was negative.    History per 9/23/21  *Long history of acid reflux status post Nissen fundoplication  *Sigmoid resection for diverticular disease  *August 19, 2013, EGD. This documented extensive amount of food debris in the esophagus and gastric lumen despite the patient having been n.p.o. for  15-1/2 hours. Dilation of the pyloric channel with an 18 mm to 20 mm TTS balloon dilator system was performed. This did not result in any improvement of symptoms and signs.   *September 2013 - perforated gastric ulcer. He underwent open laparotomy with subtotal gastrectomy and Lucinda-en-Y reconstructive surgery  *September 17, 2014 -  laparoscopic incisional hernia repair with mesh material utilized for the intervention  *January 2, 2015. EGD noted postoperative changes of subtotal gastrectomy with Lucinda-en-Y anastomosis. The jejunojejunal anastomosis was never visualized with the standard endoscope  *July of 2015 signs and symptoms suggestive of proximal small bowel obstructive process noted in clinic. Dr. Rogers saw him shortly thereafter and also felt that intermittent obstruction of the jejunojejunal anastomosis was highly likely. Operative revision was recommended.   *August 5, 2015. exploratory laparotomy with enterolysis and revision of the jejunojejunal ostomy. Dr. Rogers notes on the procedure report that there were extensive adhesions  *January 12, 2016 - Enteroscopy with postoperative anatomy of gastrojejunal anastomosis with no obvious stenosis of the gastrojejunal anastomosis, but with rather significant convolution and tortuosity encountered in the first 20 cm of the jejunal limb. Successful advancement of the pediatric colonoscope to the jejunojejunal anastomosis with what appears to be widely patent anatomy. Two limbs of small bowel transited through for an additional 40 cm as described. Solid and semisolid debris present throughout the procedure, encountered at the jejunojejunal anastomosis and in the mid functional jejunal limb between the anastomosis and the gastrojejunal anastomosis. Gastric contents present, semisolid and liquid in nature, encountered.  *January 13, 2017 - Colonoscopy normal with patent end to side colocolonic anastomosis  *September 22, 2017 HIDA scan normal  *November 29,  2017-upper endoscopy with normal esophagus, subtotal gastrectomy changes with small amount of food residue. Widely patent gastrojejunal anastomosis. Short 10 cm of jejunal limb normal mucosa. Long segment of jejunum to operative anastomosis with 2 lm identified. One lumen could be advanced into for 15 cm second lumen could not be advanced into and inspected. Small amount of old food material noted in this region.  *December 11, 2017-trigger point injection for abdominal wall pain syndrome. No clinical improvement noted  *July 17, 2018-patient evaluated by infectious disease specialists for night sweats, lack of energy, lack of sleep and abdominal pain. Urine histoplasma antigen, Blastomyces antigen, blood cultures, hepatitis C antibody, HIV antibody, treponema antibody, Brucella antibody, Q fever antibody, tick borne disease panel were all unremarkable.  *10/8/2018-gastric emptying study-delayed gastric emptying  *October 12, 2018-patient was seen by general surgery at [facility] in [place] who discussed possibility of chronic mesh infection with infectious disease specialist. Gadolinium scan had been done in September and showed some activity at the upper part of his prior hernia repair. Plan is in process to remove surgical mesh.  Risk   *November 1, 2018-initial GI consultation with myself. Trial of rifaximin for possible SIBO with no response after 10 days.  * November 28, 2018 patient went to the operating room and noted mesh failure. Mesh was removed. A supra mesh hernia was noted this was repaired. Lysis of adhesions. Patient was discharged on December 1, 2018 with oxycodone for pain control  *December 3, 2018-presented to the emergency department nausea vomiting. Was given IV fluids, Dilaudid and started on twice daily Reglan.  *December 10, 2018-again presented to the emergency department with abdominal pain, nausea and vomiting. CBC, BMP, hepatic panel and lipase all unremarkable. CT abdomen and pelvis  with no acute changes.  *2/26/2019-EGD-normal esophagus. Gastroenterostomy was found and healthy-appearing. Widely patent enteroenterostomy with healthy-appearing mucosa in the jejunum. There was some bilious fluid in the alimentary limb which I suspect is due to short limb rather than downstream obstruction given numerous cross-sectional imaging exams.  *4/6/2019-CT abdomen/pelvis without contrast-nonobstructing nephrolithiasis. Multiple sites of prior postoperative change. No evidence for bowel obstruction, abscess or free air.  *5/28/2019-CTA chest-no evidence for PE. No focal infiltrates.  *7/26/2019-CT abdomen/pelvis without contrast-obstructing 3 mm right ureteral stone just distal ureteral pelvic junction with mild hydronephrosis. Obstructing 2 mm left renal stone with associated mild hydronephrosis.  *8/20/2019-CT abdomen/pelvis with IV contrast-no acute findings. Postoperative changes of Lucinda-en-Y gastric bypass, hemicolectomy, appendectomy, sigmoid colon resection with colocolonic anastomosis.  *11/16/2019-CT abdomen/pelvis with IV contrast-acute uncomplicated diverticulitis of the descending colon.  *12/26/2019-CT abdomen/pelvis with IV contrast-acute diverticulitis of the sigmoid colon.  *1/3/2020-CT abdomen/pelvis with IV contrast-near complete resolution of changes of acute diverticulitis. No abscess or fluid collection.  *1/25/2020-CT abdomen/pelvis with IV contrast-nothing acute in abdomen or pelvis.  *2/4/2020-CT abdomen pelvis without IV contrast-stable exam. No interval changes.  *3/13/7313-DCY-lkqthaze LES otherwise normal with evidence of gastric bypass. Gastric pouch with large size and intact staple line. Colonoscopy-patent end-to-side colocolonic anastomosis which was healthy-appearing. Mild diverticulosis in the left colon with active diverticulitis. Exam otherwise normal  *4/13/2020-CT abdomen/pelvis without IV contrast-interval development of 2 small ventral hernias to the left of the  midline near the umbilicus. Findings are consistent with incisional hernias. These findings may contribute to a partial small bowel obstruction.  *5/22/2020-CT abdomen/pelvis without IV contrast-mild right hydronephrosis secondary to a 2 to 3 mm stone in the proximal ureter. Residual punctate calculus involving lower pole of right kidney.  *6/13/2020-CT abdomen/pelvis without IV contrast-nothing acute  *7/31/2020-CT abdomen/pelvis with IV contrast-new postoperative changes of ventral hernia repair. Moderate amount of blood products insinuating within the rectus musculature bilaterally as well as more discrete hematoma within the subcutaneous tissue along the midline. Small amount of intraperitoneal blood products in the pelvis. New mild dilation of several loops of small bowel in the anterior abdomen. Findings favored to be secondary to ileus rather than obstruction.  *8/7/2020-CT abdomen/pelvis without IV contrast-large anterior wall presumed hematoma increased in size now measuring 15 cm. Small amount of presumed blood in pelvis is decreased. Tiny gallstone in gallbladder with distention but no signs of acute cholecystitis.  *8/23/2020-CT abdomen/pelvis with IV contrast-increased loculation and new gas within the anterior abdominal wall collection/hematoma. Findings suspicious for infected collection. Mild dilatation of intra and extrahepatic bile ducts, nonspecific.  *11/10/2020-CT abdomen/pelvis with IV contrast-interval decrease in size of the collection within the anterior bowel wall, today measuring 16 x 41 x 51 mm. Remains suspicious for an abscess given the peripheral enhancement. No acute intra-abdominal findings.  *1/12/2021-CT abdomen/pelvis with IV contrast-fluid collection in anterior abdominal wall is resolved. Diverticulosis in the distal descending colon. No evidence for diverticulitis.   *2/22/2021-CT abdomen/pelvis with IV contrast-no acute findings  *3/2/2021-transversus abdominis nerve block  with pain management.  *4/27/2021-CT abdomen/pelvis without IV contrast-no acute findings. No direct findings for left-sided pain.  *6/3/2021-CT abdomen/pelvis without IV contrast-postoperative changes at the level of the cecum, stomach and proximal small bowel. Sigmoid colon anastomosis. Stable distended loop of small bowel adjacent to the distal anastomosis. Overall stable exam compared to prior.  *7/8/2021-CT abdomen/pelvis with IV contrast-no evidence of bowel obstruction.  *8/1/2021-abdominal x-ray-nonobstructive bowel gas pattern. Moderate amount of stool throughout the colon. Ventral hernia repair.  *9/20/2021-neurology evaluation-diagnosed with chronic fatigue syndrome. Started on amitriptyline 25 mg at bedtime.      ------------------------------------------------  Wt Readings from Last 5 Encounters:   03/27/17 102.8 kg (226 lb 9.6 oz)        Esophageal Questionnaire(s)    BEDQ Questionnaire  No flowsheet data found.  No flowsheet data found.    Eckardt Questionnaire  No flowsheet data found.    Promis 10 Questionnaire  No flowsheet data found.        STUDIES & PROCEDURES:    EGD:   Date:  Impression:  Pathology Report:    Colonoscopy:  Date:  Impression:  Pathology Report:     EndoFLIP directed at the UES or LES (8cm (EF-325) balloon length or 16cm (EF-322) balloon length):   Date:  8cm balloon  Balloon inflation Balloon pressure CSA (mm^2) DI (mm^2/mmHg) Dmin (mm) Compliance   20 (ladmark ID)        30        40        50           16cm balloon  Balloon inflation Balloon pressure CSA (mm^2) DI (mm^2/mmHg) Dmin (mm) Compliance   30 (ladmark ID)        40        50        60        70           High Resolution Manometry:  Date:  Impression:    PH/Impedance:  Date:  Impression:     Bravo:  48 or 96hr  Date:  Impression:    CT:  Date:  Impression:    Esophagram:  Date:  Impression:     Prior medical records were reviewed including, but not limited to, notes from referring providers, lab work, radiographic  tests, and other diagnostic tests. Pertinent results were summarized above.     History   No past medical history on file.    No past surgical history on file.    Social History     Socioeconomic History     Marital status:      Spouse name: Not on file     Number of children: Not on file     Years of education: Not on file     Highest education level: Not on file   Occupational History     Not on file   Tobacco Use     Smoking status: Current Every Day Smoker     Smokeless tobacco: Not on file   Substance and Sexual Activity     Alcohol use: Not on file     Drug use: Not on file     Sexual activity: Not on file   Other Topics Concern     Not on file   Social History Narrative     Not on file     Social Determinants of Health     Financial Resource Strain: Not on file   Food Insecurity: Not on file   Transportation Needs: Not on file   Physical Activity: Not on file   Stress: Not on file   Social Connections: Not on file   Intimate Partner Violence: Not on file   Housing Stability: Not on file       No family history on file.  Family history reviewed and edited as appropriate    Medications and Allergies:     Outpatient Encounter Medications as of 7/5/2022   Medication Sig Dispense Refill     Omeprazole (PRILOSEC PO) Take 40 mg by mouth 2 times daily (before meals)       Ondansetron (ZOFRAN ODT PO) Take 4 mg by mouth every 8 hours as needed for nausea       Oxybutynin Chloride (DITROPAN PO) Take 5 mg by mouth 3 times daily       Pregabalin (LYRICA PO) Take 100 mg by mouth 3 times daily       RABEprazole (ACIPHEX) 20 MG EC tablet Take 1 tablet (20 mg) by mouth 2 times daily (before meals) 180 tablet 1     Simvastatin (ZOCOR PO) Take 20 mg by mouth At Bedtime       sucralfate (CARAFATE) 1 GM tablet Take 1 g by mouth 4 times daily       SUMAtriptan Succinate Refill (IMITREX) 6 MG/0.5ML SOCT Inject Subcutaneous once       Topiramate (TOPAMAX PO) Take 25 mg by mouth 2 times daily       Zolpidem Tartrate (AMBIEN  PO) Take 10 mg by mouth At Bedtime       No facility-administered encounter medications on file as of 7/5/2022.        Not on File     Review of systems:  A full 10 point review of systems was obtained and was negative except for the pertinent positives and negatives stated within the HPI.    Objective Findings:   Physical Exam:    Telephone consultation    Labs, Radiology, Pathology     No results found for: WBC, HGB, PLT, CHOL, TRIG, HDL, LDLDIRECT, ALT, AST, NA, CREATININE, BUN, CO2, TSH, INR, GLUF     Liver Function Studies - No results for input(s): PROTTOTAL, ALBUMIN, BILITOTAL, ALKPHOS, AST, ALT, BILIDIRECT in the last 01489 hours.       Patient Active Problem List    Diagnosis Date Noted     Chronic abdominal pain 03/03/2022     Priority: Medium     Heartburn 03/03/2022     Priority: Medium     History of Lcuinda-en-Y gastric bypass 03/03/2022     Priority: Medium     Non-intractable vomiting with nausea, unspecified vomiting type 03/03/2022     Priority: Medium     History of Nissen fundoplication 03/03/2022     Priority: Medium      Assessment and Plan   Assessment:    Joseph Perdomo is a 61 year old male with a significant past medical history noted below following the HPI who is presenting as a new patient in consultation at the request of Dr. Early with a chief complaint of heartburn/reflux and chronic abdominal pain.    The patient was seen as a telephone follow-up for abdominal pain and heartburn. Since starting Aciphex, he has had significant improvement in heartburn symptoms, now occurring only several times per month. Plan to continue this for now. If symptoms worsen again, would recommend stopping PPI and undergo a 96-hour off PPI Bravo.    In regards to chronic abdominal pain, this has been evaluated with recent laparoscopy and numerous cross sectional studies within the last few months. The pain is largely postprandial and associated with early satiety, postprandial nausea and vomiting, with  self-reported weight loss. He is following for this with his local gastroenterology clinic and wishes to continue evaluations locally for convenience. We discussed considering a gastric emptying study and repeat EGD. He has no follow-up with them scheduled, encouraged him to call to schedule a follow-up.    No diagnosis found.   No orders of the defined types were placed in this encounter.     Plan:  1. Continue taking aciphex (rabeprazole) 20mg twice daily 30-60 minutes before breakfast and dinner.   2. If your symptoms are not better we may want to pursue an upper endoscopy with 96hr OFF therapy Bravo to test how much reflux you actually have.   3. Continue following with local gastroenterologist, please call to schedule a follow-up  4. For your abdominal pain you may wish to consider mirtazapine (remeron) in the future      Follow up plan:   Return to clinic as needed, continue cares with local GI    The risks and benefits of my recommendations, as well as other treatment options were discussed with the patient and any available family today. All questions were answered.     Follow up: As planned above. Today, I personally spent 15minutes in direct telephone time with the patient, of which greater than 50% of the time was spent in patient education and counseling as described above. Approximately 27 minutes were spent on indirect care associated with the patient's consultation including but not limited to review of: patient medical records to date, clinic visits, hospital records, lab results, imaging studies, procedural documentation, and coordinating care with other providers.     The patient verbalized understanding of the plan and was appreciative for the time spent and information provided during the office visit.     Author:   Anabella Ricci PA-C  Division of Gastroenterology, Hepatology, and Nutrition  [facility]     Documentation assisted by voice recognition and documentation system.

## 2022-07-05 NOTE — PATIENT INSTRUCTIONS
It was a pleasure taking care of you today.  I've included a brief summary of our discussion and care plan from today's visit below.  Please review this information with your primary care provider.  _______________________________________________________________________    My recommendations are summarized as follows:    1. Continue taking aciphex (rabeprazole) 20mg twice daily 30-60 minutes before breakfast and dinner.   2. If your symptoms are not better we may want to pursue an upper endoscopy with 96hr OFF therapy Bravo to test how much reflux you actually have.   3. Continue following with local gastroenterologist, please call to schedule a follow-up  4. For your abdominal pain you may wish to consider mirtazapine (remeron) in the future    Return to GI Clinic as needed   ______________________________________________________________________    How do I schedule labs, imaging studies, or procedures that were ordered in clinic today?     Labs: To schedule lab appointment at the [facility], use my chart or call [phone]. If you have a [facility] lab closer to home where you are regularly seen you can give them a call.     Procedures: If a colonoscopy, upper endoscopy, breath test, esophageal manometry, or pH impedence was ordered today, our endoscopy team will call you to schedule this. If you have not heard from our endoscopy team within a week, please call [phone] to schedule.     Imaging Studies: If you were scheduled for a CT scan, X-ray, MRI, ultrasound, HIDA scan or other imaging study, please call [phone] to have this scheduled.     Referral: If a referral to another specialty was ordered, expect a phone call or follow instructions above. If you have not heard from anyone regarding your referral in a week, please call our clinic to check the status.     Who do I call with any questions after my visit?  Please be in touch if there are any further questions that arise following  today's visit.  There are multiple ways to contact your gastroenterology care team.      During business hours, you may reach a Gastroenterology nurse at [phone]    To schedule or reschedule an appointment, please call [phone].     You can always send a secure message through PhotoMania.  PhotoMania messages are answered by your nurse or doctor typically within 24 hours.  Please allow extra time on weekends and holidays.      For urgent/emergent questions after business hours, you may reach the on-call GI Fellow by contacting the [facility]  at [phone].     How will I get the results of any tests ordered?    You will receive all of your results.  If you have signed up for [employer]ivel[employer], any tests ordered at your visit will be available to you after your physician reviews them.  Typically this takes 1-2 weeks.  If there are urgent results that require a change in your care plan, your physician or nurse will call you to discuss the next steps.      What is PhotoMania?  [employer]Mania is a secure way for you to access all of your healthcare records from the [facility].  It is a web based computer program, so you can sign on to it from any location.  It also allows you to send secure messages to your care team.  I recommend signing up for PhotoMania access if you have not already done so and are comfortable with using a computer.      How to I schedule a follow-up visit?  If you did not schedule a follow-up visit today, please call [phone] to schedule a follow-up office visit.      Sincerely,    Anabella Ricci PA-C  Division of Gastroenterology, Hepatology & Nutrition  [facility]

## 2022-11-17 DIAGNOSIS — R12 HEARTBURN: ICD-10-CM

## 2022-11-20 RX ORDER — RABEPRAZOLE SODIUM 20 MG/1
20 TABLET, DELAYED RELEASE ORAL
Qty: 180 TABLET | Refills: 1 | Status: SHIPPED | OUTPATIENT
Start: 2022-11-20 | End: 2023-06-06

## 2023-06-06 DIAGNOSIS — R12 HEARTBURN: ICD-10-CM

## 2023-06-06 RX ORDER — RABEPRAZOLE SODIUM 20 MG/1
TABLET, DELAYED RELEASE ORAL
Qty: 180 TABLET | Refills: 4 | Status: SHIPPED | OUTPATIENT
Start: 2023-06-06

## 2023-06-06 NOTE — CONFIDENTIAL NOTE
Refilled pt's medication. Pt will need to either re-establish care for next refill or pt may want to have other local GI provider take over prescription.

## 2025-07-01 ENCOUNTER — MEDICAL CORRESPONDENCE (OUTPATIENT)
Dept: HEALTH INFORMATION MANAGEMENT | Facility: CLINIC | Age: 65
End: 2025-07-01
Payer: COMMERCIAL

## 2025-07-02 ENCOUNTER — TRANSCRIBE ORDERS (OUTPATIENT)
Dept: OTHER | Age: 65
End: 2025-07-02

## 2025-07-02 DIAGNOSIS — K21.9 GASTROESOPHAGEAL REFLUX DISEASE, UNSPECIFIED WHETHER ESOPHAGITIS PRESENT: Primary | ICD-10-CM

## 2025-07-03 ENCOUNTER — PATIENT OUTREACH (OUTPATIENT)
Dept: ONCOLOGY | Facility: CLINIC | Age: 65
End: 2025-07-03
Payer: COMMERCIAL

## 2025-07-03 NOTE — PROGRESS NOTES
New Patient Oncology Nurse Navigator Note     Referring provider: Dr. Juan Rees    Referring Clinic/Organization: [facility]   Referred to: Thoracic Surgery  Requested provider (if applicable): Dr. Green  Referral Received: 07/03/25       Evaluation for :   Diagnosis   K21.9 (ICD-10-CM) - Gastroesophageal reflux disease, unspecified whether esophagitis present        Clinical History (per Nurse review of records provided):      05/09/2025 GI Procedure (Care Everywhere) showed:  Impression:         -  Z-line regular, 40 cm from the incisors.          - Normal esophagus. Somewhat patulous GEJ and some scarring at the             fundus consistent with prior Nissen fundoplication which is no longer             intact.          -  The BRAVO capsule was positioned 34 cm from the incisors, which             was 6 cm proximal to the GE junction.          - A gastrojejunostomy was found, characterized by healthy appearing             mucosa at the anastomosis. The anastomosis was widely patent and the             axial length of the gastric pouch was 15 cm.          - A medium amount of food (residue) in the stomach consistent with             delayed gastric emptying. NO evidence of outlet obstruction.          - Normal examined jejunum. Roughly 5 cm blind limb and normal             appearing alimentary limb.          -  No specimens collected.     Clinical Assessment / Barriers to Care (Per Nurse):  Tobacco Use Types Packs/Day Years Used Date   Smoking Tobacco: Every Day Cigarettes 0.5 35       Remote history of prior Nissen     Records Location: [facility]   Records Needed: Outside CT, Esophagram images, previous Nissen surgery operative report  Additional testing needed prior to consult:   Referral updates and Plan:     7/3: Marked for records needed    KATERINA DukesN, RN, OCN  [facility] Oncology Nurse Navigator  [phone] / [phone]

## 2025-07-07 ENCOUNTER — PRE VISIT (OUTPATIENT)
Dept: ONCOLOGY | Facility: CLINIC | Age: 65
End: 2025-07-07
Payer: COMMERCIAL

## 2025-07-07 NOTE — TELEPHONE ENCOUNTER
RECORDS STATUS - ALL OTHER DIAGNOSIS      RECORDS RECEIVED FROM:    DIAGNOSIS: Gastroesophageal reflux disease, unspecified whether esophagitis present [K21.9]    NOTES STATUS DETAILS   OFFICE NOTE from referring provider  Dr. Juan Rees   OFFICE NOTE from medical oncologist     OFFICE NOTE from other specialist     DISCHARGE SUMMARY from hospital     DISCHARGE REPORT from the ER     OPERATIVE REPORT     MEDICATION LIST     LABS     PATHOLOGY REPORTS     ANYTHING RELATED TO DIAGNOSIS     PATHOLOGY [facility] TRACKING   Tracking #:   GENONOMIC TESTING     TYPE:     IMAGING (NEED IMAGES & REPORT)     CT SCANS Req 7/7 [facility]:  01/03/20: CT AP   MRI Req 7/7 [facility]:  04/06/22: MR Abd   XRAYS Req 7/7 [facility]:  03/07/25: XR Esophagram  01/03/25-05/25/20: XR Abd   ULTRASOUND Req 7/7 [facility]:  05/25/20: US Renal  09/10/20: US Abd   PET     IMAGE DISC [facility] TRACKING   Tracking #: